# Patient Record
Sex: FEMALE | Race: BLACK OR AFRICAN AMERICAN | Employment: FULL TIME | URBAN - METROPOLITAN AREA
[De-identification: names, ages, dates, MRNs, and addresses within clinical notes are randomized per-mention and may not be internally consistent; named-entity substitution may affect disease eponyms.]

---

## 2019-04-27 ENCOUNTER — HOSPITAL ENCOUNTER (EMERGENCY)
Age: 44
Discharge: HOME | End: 2019-04-27
Attending: EMERGENCY MEDICINE
Payer: COMMERCIAL

## 2019-04-27 ENCOUNTER — APPOINTMENT (OUTPATIENT)
Dept: GENERAL RADIOLOGY | Age: 44
End: 2019-04-27
Payer: COMMERCIAL

## 2019-04-27 VITALS
BODY MASS INDEX: 32.14 KG/M2 | WEIGHT: 200 LBS | DIASTOLIC BLOOD PRESSURE: 84 MMHG | SYSTOLIC BLOOD PRESSURE: 169 MMHG | HEART RATE: 85 BPM | TEMPERATURE: 98.6 F | HEIGHT: 66 IN | RESPIRATION RATE: 17 BRPM | OXYGEN SATURATION: 100 %

## 2019-04-27 DIAGNOSIS — S39.012A STRAIN OF LUMBAR REGION, INITIAL ENCOUNTER: Primary | ICD-10-CM

## 2019-04-27 PROCEDURE — 99284 EMERGENCY DEPT VISIT MOD MDM: CPT

## 2019-04-27 PROCEDURE — 6370000000 HC RX 637 (ALT 250 FOR IP): Performed by: STUDENT IN AN ORGANIZED HEALTH CARE EDUCATION/TRAINING PROGRAM

## 2019-04-27 PROCEDURE — 96372 THER/PROPH/DIAG INJ SC/IM: CPT

## 2019-04-27 PROCEDURE — 72100 X-RAY EXAM L-S SPINE 2/3 VWS: CPT

## 2019-04-27 PROCEDURE — 6360000002 HC RX W HCPCS: Performed by: STUDENT IN AN ORGANIZED HEALTH CARE EDUCATION/TRAINING PROGRAM

## 2019-04-27 RX ORDER — KETOROLAC TROMETHAMINE 30 MG/ML
30 INJECTION, SOLUTION INTRAMUSCULAR; INTRAVENOUS ONCE
Status: COMPLETED | OUTPATIENT
Start: 2019-04-27 | End: 2019-04-27

## 2019-04-27 RX ORDER — ACETAMINOPHEN 500 MG
1000 TABLET ORAL 3 TIMES DAILY PRN
Qty: 180 TABLET | Refills: 0 | Status: SHIPPED | OUTPATIENT
Start: 2019-04-27 | End: 2020-03-13 | Stop reason: SDUPTHER

## 2019-04-27 RX ORDER — ACETAMINOPHEN 500 MG
1000 TABLET ORAL ONCE
Status: COMPLETED | OUTPATIENT
Start: 2019-04-27 | End: 2019-04-27

## 2019-04-27 RX ORDER — IBUPROFEN 800 MG/1
800 TABLET ORAL EVERY 8 HOURS PRN
Qty: 30 TABLET | Refills: 0 | Status: SHIPPED | OUTPATIENT
Start: 2019-04-27 | End: 2020-03-13 | Stop reason: SDUPTHER

## 2019-04-27 RX ORDER — DIAZEPAM 5 MG/1
5 TABLET ORAL ONCE
Status: COMPLETED | OUTPATIENT
Start: 2019-04-27 | End: 2019-04-27

## 2019-04-27 RX ORDER — CYCLOBENZAPRINE HCL 10 MG
10 TABLET ORAL 3 TIMES DAILY PRN
Qty: 30 TABLET | Refills: 0 | Status: SHIPPED | OUTPATIENT
Start: 2019-04-27 | End: 2019-05-07

## 2019-04-27 RX ADMIN — DIAZEPAM 5 MG: 5 TABLET ORAL at 22:40

## 2019-04-27 RX ADMIN — KETOROLAC TROMETHAMINE 30 MG: 30 INJECTION, SOLUTION INTRAMUSCULAR at 22:40

## 2019-04-27 RX ADMIN — ACETAMINOPHEN 1000 MG: 500 TABLET, FILM COATED ORAL at 22:40

## 2019-04-27 ASSESSMENT — PAIN DESCRIPTION - ORIENTATION: ORIENTATION: RIGHT

## 2019-04-27 ASSESSMENT — ENCOUNTER SYMPTOMS
ABDOMINAL PAIN: 0
SORE THROAT: 0
NAUSEA: 0
VOMITING: 0
SHORTNESS OF BREATH: 0
BACK PAIN: 1
DIARRHEA: 0
COUGH: 0

## 2019-04-27 ASSESSMENT — PAIN DESCRIPTION - LOCATION: LOCATION: BACK

## 2019-04-27 ASSESSMENT — PAIN DESCRIPTION - PAIN TYPE: TYPE: ACUTE PAIN

## 2019-04-27 ASSESSMENT — PAIN SCALES - GENERAL
PAINLEVEL_OUTOF10: 8
PAINLEVEL_OUTOF10: 8

## 2019-04-27 ASSESSMENT — PAIN DESCRIPTION - DESCRIPTORS: DESCRIPTORS: SHOOTING

## 2019-04-28 NOTE — ED TRIAGE NOTES
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 Pt reports a shooting pain in her back and down her legs.

## 2019-04-28 NOTE — ED PROVIDER NOTES
[address]  Emergency Department Encounter  EmergencyMedicine Resident     Pt Maverick Shah  MRN: [id number]  [place] 1975  Date of evaluation: 4/27/19  PCP:  Carter Hernandez MD    CHIEF COMPLAINT       Chief Complaint   Patient presents with    Back Pain       HISTORY OF PRESENT ILLNESS  (Location/Symptom, Timing/Onset, Context/Setting, Quality, Duration, Modifying Factors, Severity.)      Kathrynn Dakins is a 39 y.o. female who presents with acute bilateral lower back pain after she tried to move a heavy patient at work today. Patient was transferring a patient from a stretcher onto a bed; she was pulling a patient towards herself. The patient was very heavy and during the transfer she develops acute onset lower back pain. Since then, her symptoms have become worse, with limited range of motion and inability to bend forward to complete her duties at work. She was sent by her work for evaluation. She denies any numbness or tingling, focal weakness, urinary retention or incontinence, or any saddle anesthesia. No fever and no history of IV drug use. No fall or other traumatic injury. Patient has not taken any medications for her symptoms. She does not have chronic back pain. PAST MEDICAL / SURGICAL / SOCIAL / FAMILY HISTORY      has a past medical history of Asthma, CAD (coronary artery disease), Diabetes mellitus ([address] Hypertension, Pacemaker, and Seizures ([address] has a past surgical history that includes Hysterectomy; Appendectomy; Cholecystectomy; Ankle surgery; hip surgery; Wrist surgery; and Pacemaker insertion (04/07/2014).     Social History     Socioeconomic History    Marital status:      Spouse name: Not on file    Number of children: Not on file    Years of education: Not on file    Highest education level: Not on file   Occupational History    Not on file   Social Needs    Financial resource strain: Not on file    Food insecurity:     Worry: Not on file     Inability: Not on file    Transportation needs:     Medical: Not on file     Non-medical: Not on file   Tobacco Use    Smoking status: Current Some Day Smoker    Smokeless tobacco: Never Used    Tobacco comment: REPORTS CUTTING BACK- DOWN TO 4 A DAY   Substance and Sexual Activity    Alcohol use: No    Drug use: No    Sexual activity: Not on file   Lifestyle    Physical activity:     Days per week: Not on file     Minutes per session: Not on file    Stress: Not on file   Relationships    Social connections:     Talks on phone: Not on file     Gets together: Not on file     Attends Spiritism service: Not on file     Active member of club or organization: Not on file     Attends meetings of clubs or organizations: Not on file     Relationship status: Not on file    Intimate partner violence:     Fear of current or ex partner: Not on file     Emotionally abused: Not on file     Physically abused: Not on file     Forced sexual activity: Not on file   Other Topics Concern    Not on file   Social History Narrative    Not on file       History reviewed. No pertinent family history. Allergies:  Aspirin and Celebrex [celecoxib]    Home Medications:  Prior to Admission medications    Medication Sig Start Date End Date Taking?  Authorizing Provider   ibuprofen (ADVIL;MOTRIN) 800 MG tablet Take 1 tablet by mouth every 8 hours as needed for Pain 4/27/19  Yes Evelyn Calles MD   acetaminophen (TYLENOL) 500 MG tablet Take 2 tablets by mouth 3 times daily as needed for Pain 4/27/19  Yes Evelyn Calles MD   cyclobenzaprine (FLEXERIL) 10 MG tablet Take 1 tablet by mouth 3 times daily as needed for Muscle spasms 4/27/19 5/7/19 Yes Evelyn Calles MD   Alcohol Swabs (B-D SINGLE USE SWABS REGULAR) PADS  3/15/16   Historical Provider, MD   isosorbide mononitrate (IMDUR) 120 MG CR tablet  3/15/16   Historical Provider, MD   topiramate (TOPAMAX) 100 MG tablet  3/15/16   Historical Provider, MD   venlafaxine [facility]) 37.5 MG tablet  3/15/16   Historical Provider, MD   atorvastatin (LIPITOR) 80 MG tablet  2/19/16   Historical Provider, MD   REXULTI 2 MG TABS tablet  2/19/16   Historical Provider, MD   carvedilol (COREG) 12.5 MG tablet  2/17/16   Historical Provider, MD   FLOVENT DISKUS 100 MCG/BLIST AEPB  2/18/16   Historical Provider, MD   hydrOXYzine (ATARAX) 25 MG tablet  2/19/16   Historical Provider, MD [place] SHORT PEN NEEDLES 31G X 8 MM [facility]  2/17/16   Historical Provider, MD   EASY TOUCH LANCETS 30G/TWIST [facility]  2/17/16   Historical Provider, MD   levETIRAcetam (KEPPRA) 1000 MG tablet  2/17/16   Historical Provider, MD   lidocaine (LIDAMANTLE) 3 % CREA  1/18/16   Historical Provider, MD   loratadine (CLARITIN) 10 MG tablet  1/28/16   Historical Provider, MD   losartan-hydrochlorothiazide (HYZAAR) 100-25 MG per tablet  2/17/16   Historical Provider, MD   magnesium oxide (MAG-OX) 400 (241.3 MG) MG TABS tablet  2/17/16   Historical Provider, MD   metFORMIN (GLUCOPHAGE) 500 MG tablet  1/18/16   Historical Provider, MD   nadolol (CORGARD) 40 MG tablet  2/17/16   Historical Provider, MD   ondansetron (ZOFRAN) 4 MG tablet  11/25/15   Historical Provider, MD   oxyCODONE-acetaminophen (PERCOCET) 5-325 MG per tablet  2/25/16   Historical Provider, MD   pantoprazole (PROTONIX) 40 MG tablet  2/17/16   Historical Provider, MD   JANUMET  MG per tablet  2/27/16   Historical Provider, MD   venlafaxine (EFFEXOR-XR) 75 MG XR capsule  2/19/16   Historical Provider, MD   Insulin Aspart (NOVOLOG SC) Inject into the skin    Historical Provider, MD   meloxicam (MOBIC) 15 MG tablet Take 1 tablet by mouth daily 3/3/16   Junbedee Parker, DPM   isosorbide mononitrate (IMDUR) 30 MG CR tablet Take 1 tablet by mouth daily 6/5/15   Cheikh Almanza MD   beclomethasone (QVAR) 80 MCG/ACT inhaler Inhale 2 puffs into the lungs 2 times daily 6/5/15   Cheikh Almanza MD   traZODone (DESYREL) 50 MG tablet Take 1 tablet by mouth nightly as needed for Sleep 6/5/15   Tunde Franks MD   insulin glargine (LANTUS) 100 UNIT/ML injection vial Inject 15 Units into the skin daily 6/5/15   Tunde Franks MD   amLODIPine (NORVASC) 10 MG tablet Take 1 tablet by mouth daily 6/5/15   Tunde Franks MD   clopidogrel (PLAVIX) 75 MG tablet Take 1 tablet by mouth daily 6/5/15   Tunde Franks MD   ranolazine (RANEXA) 1000 MG SR tablet Take 500 mg by mouth 2 times daily. Historical Provider, MD   HYDROCHLOROTHIAZIDE PO Take  by mouth daily. Historical Provider, MD       REVIEW OF SYSTEMS    (2-9 systems for level 4, 10 or more for level 5)      Review of Systems   Constitutional: Negative for chills, fatigue and fever. HENT: Negative for congestion and sore throat. Eyes: Negative for visual disturbance. Respiratory: Negative for cough and shortness of breath. Cardiovascular: Negative for chest pain. Gastrointestinal: Negative for abdominal pain, diarrhea, nausea and vomiting. Genitourinary: Negative for dysuria, flank pain and hematuria. Musculoskeletal: Positive for back pain. Negative for arthralgias, gait problem, neck pain and neck stiffness. Skin: Negative for rash and wound. Neurological: Negative for syncope, weakness, light-headedness, numbness and headaches. PHYSICAL EXAM   (up to 7 for level 4, 8 or more for level 5)      INITIAL VITALS:   BP (!) 169/84   Pulse 85   Temp 98.6 °F (37 °C)   Resp 17   Ht 5' 6\" (1.676 m)   Wt 200 lb (90.7 kg)   SpO2 100%   BMI 32.28 kg/m²     Physical Exam   Gen. Appearance: patient appears well, nondistressed. HEENT: head atraumatic, normocephalic. Pupils equal and reactive to light. Oropharynx clear and moist.  Neck: Supple, normal range of motion. No lymphadenopathy. Pulmonary: Lungs clear to auscultation bilaterally. Equal air entry right left. Cardiovascular:  Heart sounds normal, no murmurs.   Peripheral pulses strong, regular, equal.   Abdomen: Soft, nontender, nondistended. Bowel sounds normal. No palpable masses. Back: Atraumatic in appearance. Mild midline tenderness with palpation of lumbar spine. No tenderness to palpation of cervical or thoracic spine. There is tenderness to palpation of paraspinal muscles bilaterally, worse on the right, with tenderness to palpation of the right upper buttock. Straight leg raise positive on the right. Neurology: GCS 15. Oriented to person place and time. Normal tone and power in all 4 extremities. No sensory deficits. Skin: Warm, dry, well perfused      DIFFERENTIAL  DIAGNOSIS     PLAN (LABS / Catherine Rhyme / EKG):  Orders Placed This Encounter   Procedures    XR LUMBAR SPINE (2-3 VIEWS)       MEDICATIONS ORDERED:  Orders Placed This Encounter   Medications    ketorolac (TORADOL) injection 30 mg    acetaminophen (TYLENOL) tablet 1,000 mg    diazepam (VALIUM) tablet 5 mg    ibuprofen (ADVIL;MOTRIN) 800 MG tablet     Sig: Take 1 tablet by mouth every 8 hours as needed for Pain     Dispense:  30 tablet     Refill:  0    acetaminophen (TYLENOL) 500 MG tablet     Sig: Take 2 tablets by mouth 3 times daily as needed for Pain     Dispense:  180 tablet     Refill:  0    cyclobenzaprine (FLEXERIL) 10 MG tablet     Sig: Take 1 tablet by mouth 3 times daily as needed for Muscle spasms     Dispense:  30 tablet     Refill:  0       DDX:   cauda equina, herniated disc, muscle spasm    DIAGNOSTIC RESULTS / EMERGENCY DEPARTMENT COURSE / MDM     LABS:  No results found for this visit on 04/27/19. IMPRESSION: 39year old patient presents with lower back pain after straining herself at work. Patient is afebrile, hemodynamically stable, and in no acute distress. Appropriately interactive and cooperative with interview and examination.   Will respiratory effort, lungs clear bilaterally, heart sounds normal, abdomen benign, neurologically normal.  Absolutely no findings to suggest cauda equina; there is no numbness or tingling, no focal weakness, no bowel or bladder symptoms, no saddle anesthesia. No elements of history or findings on examination to suggest epidural abscess; she is afebrile, has no history of IV drug use, and has no point tenderness and no increased warmth over the spine. Patient appears to have muscle spasm versus possible herniated disc. Plan for x-ray lumbar spine, Toradol, Tylenol, Valium. Reassess. Likely discharge with outpatient follow-up. RADIOLOGY:  XR LUMBAR SPINE (2-3 VIEWS) (Final result)   Result time 04/27/19 23:11:01   Final result by Stephanie Myers MD (04/27/19 23:11:01)                Impression:    Unremarkable examination of the lumbar spine. Extensive aortoiliac atherosclerotic disease, greater than expected for age. Narrative:    EXAMINATION:  3 XRAY VIEWS OF THE LUMBAR SPINE    4/27/2019 11:05 pm    COMPARISON:  None. HISTORY:  ORDERING SYSTEM PROVIDED HISTORY: work injury; midline pain w/ right sided  sciatica  TECHNOLOGIST PROVIDED HISTORY:  work injury; midline pain w/ right sided sciatica  Ordering Physician Provided Reason for Exam: work injury; midline pain w/  right sided sciatica  Acuity: Acute  Type of Exam: Initial  Mechanism of Injury: Pt states low back pain radiating down right leg started  about 6 hrs ago when transferring a patient at [facility]. Relevant Medical/Surgical History: Pt states low back pain radiating down  right leg started about 6 hrs ago when transferring a patient at [facility]. FINDINGS:  Lumbar vertebral bodies are normal in height and alignment. No evidence of  fracture. Visualized sacrum is unremarkable. No significant degenerative changes.                 EKG  None    All EKG's are interpreted by the Emergency Department Physician who either signs or Co-signs this chart in the absence of a cardiologist.    EMERGENCY DEPARTMENT COURSE:    Patient was counseled to follow up with their Primary Care Provider as soon as

## 2019-04-30 ENCOUNTER — TELEPHONE (OUTPATIENT)
Dept: BARIATRICS/WEIGHT MGMT | Age: 44
End: 2019-04-30

## 2019-04-30 NOTE — TELEPHONE ENCOUNTER
Online Info Session Completed:  on 4/29/19. Per pts online session BMI 34.9 per last visit in [facility] facility on 4/27/19 BMI is 32.28. Per MMO guidelines and [place] guidelines patient does not meet criteria for surgical weight loss. I called patient to inform her of this and she states I am wrong because she qualified for [facility] and [facility]'s Weight loss programs. I told her I am not sure but I was going by MMO and [place] guidelines and she got mad and hung up on me.

## 2019-05-15 ENCOUNTER — HOSPITAL ENCOUNTER (OUTPATIENT)
Dept: PHYSICAL THERAPY | Age: 44
Setting detail: THERAPIES SERIES
Discharge: HOME | End: 2019-05-15
Payer: COMMERCIAL

## 2019-05-15 PROCEDURE — 97110 THERAPEUTIC EXERCISES: CPT

## 2019-05-15 PROCEDURE — 97161 PT EVAL LOW COMPLEX 20 MIN: CPT

## 2019-05-15 ASSESSMENT — PAIN DESCRIPTION - DESCRIPTORS: DESCRIPTORS: CONSTANT;SHARP

## 2019-05-15 ASSESSMENT — PAIN DESCRIPTION - ORIENTATION: ORIENTATION: LOWER;RIGHT

## 2019-05-15 ASSESSMENT — PAIN DESCRIPTION - FREQUENCY: FREQUENCY: CONTINUOUS

## 2019-05-15 ASSESSMENT — PAIN SCALES - GENERAL: PAINLEVEL_OUTOF10: 7

## 2019-05-15 ASSESSMENT — PAIN DESCRIPTION - LOCATION: LOCATION: BACK;LEG

## 2019-05-15 NOTE — PROGRESS NOTES
[facility] Exercise Log  Aquatic, Hip & DLS Program- Phase 1    Date of Eval:  5/15/19                              Primary PT:Juan  Diagnosis:thoracic and lumbar sprain  Things to Focus On (goals): get back to normal  Surgical Precautions:  Medical Precautions:seizure,pacemaker,DM,asthma,HTN  [x] C-9 dates-ends 5/31/19  [x] Occ Med -fax daily note to Dr Ty Richardson  [] Medicare   Try hanging on deep end    Date        Visit #        Walk F/L/R        Marching        Squats        Step-Ups F/L        Heel-toe raises        SLR F/L/R        Knee/Flex/Ext        F/L Lunges        Kickboard Ex. Iso Abd. Push-pull        Paddling                Balance                        Stretches        Achllies        Hamstring                                .                 Pain Rating

## 2019-05-15 NOTE — PROGRESS NOTES
60/Abd 20/ext 10  AROM LLE (degrees)  LLE AROM : WFL  Spine  Lumbar: AROM TRUNK FLEX 30 EXT 10 ROTB 50% SBB 20  Strength RLE  Strength RLE: Exception  R Hip Flexion: 3-/5  R Hip Extension: 3-/5  R Hip ABduction: 3-/5  R Ankle Dorsiflexion: 3+/5  R Ankle Plantar flexion: 3+/5  Strength LLE  Strength LLE: WNL  Bed mobility  Rolling to Left: Independent  Rolling to Right: Independent  Supine to Sit: Independent  Sit to Supine: Independent  Transfers  Sit to Stand: Independent  Stand to sit: Independent  Bed to Chair: Independent  Stand Pivot Transfers: Independent  Ambulation  Ambulation?: Yes  Ambulation 1  Surface: level tile  Device: No Device  Assistance: Independent  Stairs/Curb  Stairs?: No     Exercises  Exercise 1: PREP-prone position 5-10' to eliminate RLE pain  Exercise 2: towel roll on lumbar area when sitting  Exercise 3: proper body mechanics-no flexion or twisting    Assessment   Conditions Requiring Skilled Therapeutic Intervention  Body structures, Functions, Activity limitations: Decreased functional mobility ; Decreased ADL status; Decreased ROM; Decreased strength; Increased Pain  Assessment: lumbar pain radiating to RLE limiting function  Treatment Diagnosis: [id number] difficulty [id number] NEC  Prognosis: Good  Decision Making: Low Complexity  History: injured low back a month ago from lifting at work  Exam: limited trunk ROM weak RLE  Clinical Presentation: oswestry score 48%  Patient Education: extension protocol as in ex sheet  Barriers to Learning: none  REQUIRES PT FOLLOW UP: Yes  Treatment Initiated : therapeutic ex lumbar  Discharge Recommendations: Home independently  Activity Tolerance  Activity Tolerance: Patient limited by pain         Plan   Plan  Times per week: 3x/week-10 visits total  Plan weeks: 3 weeks  Current Treatment Recommendations: Aquatics, ROM, Home Exercise Program  Plan Comment: instructed in extension protocol    OutComes Score  Oswestry CMS Modifier: CK  Oswestry Disability Scores %: 48    Goals  Short term goals  Time Frame for Short term goals: 10 visits  Short term goal 1: decrease lumbar/RLE pain by 50% so patient can sleep better  Short term goal 2: increase Trunk AROM to full   Short term goal 3: increase strength RLE 5/5  Short term goal 4: indep with HEP  Short term goal 5: improve oswestry score from 48 to 58% or better  Patient Goals   Patient goals : get back to normal     Treatment Charges: Minutes Units   []  Ultrasound     []  Electrical-Stim     []  Iontophoresis     []  Traction     []  Massage       [x]  Eval 20 1   []  Gait     [x]  Ther Exercise 20  1    []  Manual Therapy       []  Ther Activities       []  Aquatics     []  Vasopneumatic Device     []  Neuro Re-Ed       []  Other       Total Treatment Time: 40 2        Therapy Time   Individual Concurrent Group Co-treatment   Time In 1120         Time Out 1200         Minutes 40         Timed Code Treatment Minutes: 20 Minutes    Patient Goals:get back to normal    Comments/Assessment:    Rehab Potential:  [x] Good  [] Fair  [] Poor   Suggested Professional Referral:  [x] No  [] Yes:  Barriers to Goal Achievement:  [] No  [x] Yes:possible HNP lumbar  Domestic Concerns:  [x] No  [] Yes:    Treatment Plan:  [] Therapeutic Exercise    [] Modalities:  [] Therapeutic Activity    [] Ultrasound  [] Electrical Stimulation  [] Gait Training     [] Massage       [] Lumbar/Cervical Traction  [] Neuromuscular Re-education [] Cold/hot pack [] Other:  [x] Instruction in HEP              [] Work Conditioning                                  [] Manual Therapy                     [x] Aquatic Therapy     [] Vasocompression  [] Iontophoresis: 4 mg/mL                      Dexamethasone Sodium      Phosphate 40-80mAmin (Allergies Reviewed)     Frequency:         3  X/wk x        3  Wk's 10 visits total      [x] Plans/Goals, Risk/Benefits discussed with pt/family  Comprehension of Education [x] yes  [] Needs Review  Pt/Family Education: [x] Verbal  [x] Demo  [x] Written    More objective information is available upon request.  Thank you for this referral.        Medicare/Regulatory Requirements:  I have reviewed this plan of care and certify a need for   Medically necessary rehabilitation services.   [] Physician Signature    Date:     Electronically signed by: Antoinette Charles, [id number] @ [address], [id number] [place] [address]  Phone [phone]  Fax [phone]

## 2019-05-20 ENCOUNTER — HOSPITAL ENCOUNTER (OUTPATIENT)
Dept: PHYSICAL THERAPY | Age: 44
Setting detail: THERAPIES SERIES
Discharge: HOME | End: 2019-05-20
Payer: COMMERCIAL

## 2019-05-24 ENCOUNTER — APPOINTMENT (OUTPATIENT)
Dept: PHYSICAL THERAPY | Age: 44
End: 2019-05-24
Payer: COMMERCIAL

## 2019-06-11 NOTE — PROGRESS NOTES
[] Other:  [] Vasocompression                 If you have any questions or concerns regarding this patient's care, please contact us.    Thank you for your referral.      Electronically signed by: Filippo England PT

## 2019-08-28 ENCOUNTER — HOSPITAL ENCOUNTER (OUTPATIENT)
Dept: GENERAL RADIOLOGY | Facility: CLINIC | Age: 44
Discharge: HOME | End: 2019-08-30

## 2019-08-28 ENCOUNTER — HOSPITAL ENCOUNTER (OUTPATIENT)
Facility: CLINIC | Age: 44
Discharge: HOME | End: 2019-08-30

## 2019-08-28 DIAGNOSIS — S69.92XA FINGER INJURY, LEFT, INITIAL ENCOUNTER: ICD-10-CM

## 2019-08-28 PROCEDURE — 73140 X-RAY EXAM OF FINGER(S): CPT

## 2019-09-16 ENCOUNTER — HOSPITAL ENCOUNTER (OUTPATIENT)
Dept: GENERAL RADIOLOGY | Facility: CLINIC | Age: 44
Discharge: HOME | End: 2019-09-18

## 2019-09-16 ENCOUNTER — HOSPITAL ENCOUNTER (OUTPATIENT)
Facility: CLINIC | Age: 44
Discharge: HOME | End: 2019-09-18

## 2019-09-16 DIAGNOSIS — S60.042D CONTUSION OF LEFT RING FINGER WITHOUT DAMAGE TO NAIL, SUBSEQUENT ENCOUNTER: ICD-10-CM

## 2019-09-16 PROCEDURE — 73130 X-RAY EXAM OF HAND: CPT

## 2020-03-12 ENCOUNTER — HOSPITAL ENCOUNTER (EMERGENCY)
Age: 45
Discharge: HOME | End: 2020-03-13
Attending: EMERGENCY MEDICINE
Payer: COMMERCIAL

## 2020-03-12 ENCOUNTER — APPOINTMENT (OUTPATIENT)
Dept: GENERAL RADIOLOGY | Age: 45
End: 2020-03-12
Payer: COMMERCIAL

## 2020-03-12 PROCEDURE — 96374 THER/PROPH/DIAG INJ IV PUSH: CPT

## 2020-03-12 PROCEDURE — 85025 COMPLETE CBC W/AUTO DIFF WBC: CPT

## 2020-03-12 PROCEDURE — 71045 X-RAY EXAM CHEST 1 VIEW: CPT

## 2020-03-12 PROCEDURE — 6370000000 HC RX 637 (ALT 250 FOR IP): Performed by: STUDENT IN AN ORGANIZED HEALTH CARE EDUCATION/TRAINING PROGRAM

## 2020-03-12 PROCEDURE — 83605 ASSAY OF LACTIC ACID: CPT

## 2020-03-12 PROCEDURE — 6360000002 HC RX W HCPCS: Performed by: STUDENT IN AN ORGANIZED HEALTH CARE EDUCATION/TRAINING PROGRAM

## 2020-03-12 PROCEDURE — 0100U HC RESPIRPTHGN MULT REV TRANS & AMP PRB TECH 21 TRGT: CPT

## 2020-03-12 PROCEDURE — 80048 BASIC METABOLIC PNL TOTAL CA: CPT

## 2020-03-12 PROCEDURE — 99285 EMERGENCY DEPT VISIT HI MDM: CPT

## 2020-03-12 RX ORDER — SODIUM CHLORIDE, SODIUM LACTATE, POTASSIUM CHLORIDE, AND CALCIUM CHLORIDE .6; .31; .03; .02 G/100ML; G/100ML; G/100ML; G/100ML
30 INJECTION, SOLUTION INTRAVENOUS ONCE
Status: DISCONTINUED | OUTPATIENT
Start: 2020-03-12 | End: 2020-03-12

## 2020-03-12 RX ORDER — ACETAMINOPHEN 500 MG
1000 TABLET ORAL ONCE
Status: COMPLETED | OUTPATIENT
Start: 2020-03-12 | End: 2020-03-12

## 2020-03-12 RX ORDER — KETOROLAC TROMETHAMINE 15 MG/ML
15 INJECTION, SOLUTION INTRAMUSCULAR; INTRAVENOUS ONCE
Status: COMPLETED | OUTPATIENT
Start: 2020-03-12 | End: 2020-03-12

## 2020-03-12 RX ADMIN — KETOROLAC TROMETHAMINE 15 MG: 15 INJECTION, SOLUTION INTRAMUSCULAR; INTRAVENOUS at 23:55

## 2020-03-12 RX ADMIN — ACETAMINOPHEN 1000 MG: 500 TABLET ORAL at 23:55

## 2020-03-12 ASSESSMENT — PAIN SCALES - GENERAL: PAINLEVEL_OUTOF10: 10

## 2020-03-12 ASSESSMENT — PAIN DESCRIPTION - LOCATION: LOCATION: CHEST

## 2020-03-13 VITALS
WEIGHT: 202 LBS | TEMPERATURE: 98.2 F | RESPIRATION RATE: 18 BRPM | OXYGEN SATURATION: 96 % | DIASTOLIC BLOOD PRESSURE: 69 MMHG | HEIGHT: 65 IN | SYSTOLIC BLOOD PRESSURE: 108 MMHG | HEART RATE: 93 BPM | BODY MASS INDEX: 33.66 KG/M2

## 2020-03-13 LAB
ABSOLUTE EOS #: 0.39 K/UL (ref 0–0.44)
ABSOLUTE IMMATURE GRANULOCYTE: <0.03 K/UL (ref 0–0.3)
ABSOLUTE LYMPH #: 1.58 K/UL (ref 1.1–3.7)
ABSOLUTE MONO #: 0.67 K/UL (ref 0.1–1.2)
ADENOVIRUS PCR: NOT DETECTED
ANION GAP SERPL CALCULATED.3IONS-SCNC: 12 MMOL/L (ref 9–17)
BASOPHILS # BLD: 1 % (ref 0–2)
BASOPHILS ABSOLUTE: 0.05 K/UL (ref 0–0.2)
BORDETELLA PARAPERTUSSIS: NOT DETECTED
BORDETELLA PERTUSSIS PCR: NOT DETECTED
BUN BLDV-MCNC: 19 MG/DL (ref 6–20)
BUN/CREAT BLD: ABNORMAL (ref 9–20)
CALCIUM SERPL-MCNC: 9.3 MG/DL (ref 8.6–10.4)
CHLAMYDIA PNEUMONIAE BY PCR: NOT DETECTED
CHLORIDE BLD-SCNC: 100 MMOL/L (ref 98–107)
CO2: 23 MMOL/L (ref 20–31)
CORONAVIRUS 229E PCR: NOT DETECTED
CORONAVIRUS HKU1 PCR: NOT DETECTED
CORONAVIRUS NL63 PCR: NOT DETECTED
CORONAVIRUS OC43 PCR: NOT DETECTED
CREAT SERPL-MCNC: 1.11 MG/DL (ref 0.5–0.9)
DIFFERENTIAL TYPE: ABNORMAL
EOSINOPHILS RELATIVE PERCENT: 5 % (ref 1–4)
GFR AFRICAN AMERICAN: >60 ML/MIN
GFR NON-AFRICAN AMERICAN: 53 ML/MIN
GFR SERPL CREATININE-BSD FRML MDRD: ABNORMAL ML/MIN/{1.73_M2}
GFR SERPL CREATININE-BSD FRML MDRD: ABNORMAL ML/MIN/{1.73_M2}
GLUCOSE BLD-MCNC: 155 MG/DL (ref 70–99)
HCT VFR BLD CALC: 37.9 % (ref 36.3–47.1)
HEMOGLOBIN: 12.1 G/DL (ref 11.9–15.1)
HUMAN METAPNEUMOVIRUS PCR: NOT DETECTED
IMMATURE GRANULOCYTES: 0 %
INFLUENZA A BY PCR: ABNORMAL
INFLUENZA A H1 (2009) PCR: ABNORMAL
INFLUENZA A H1 PCR: ABNORMAL
INFLUENZA A H3 PCR: ABNORMAL
INFLUENZA B BY PCR: NOT DETECTED
LACTIC ACID, SEPSIS WHOLE BLOOD: 1.5 MMOL/L (ref 0.5–1.9)
LACTIC ACID, SEPSIS: NORMAL MMOL/L (ref 0.5–1.9)
LYMPHOCYTES # BLD: 20 % (ref 24–43)
MCH RBC QN AUTO: 28.1 PG (ref 25.2–33.5)
MCHC RBC AUTO-ENTMCNC: 31.9 G/DL (ref 28.4–34.8)
MCV RBC AUTO: 87.9 FL (ref 82.6–102.9)
MONOCYTES # BLD: 8 % (ref 3–12)
MYCOPLASMA PNEUMONIAE PCR: NOT DETECTED
NRBC AUTOMATED: 0 PER 100 WBC
PARAINFLUENZA 1 PCR: NOT DETECTED
PARAINFLUENZA 2 PCR: NOT DETECTED
PARAINFLUENZA 3 PCR: NOT DETECTED
PARAINFLUENZA 4 PCR: NOT DETECTED
PDW BLD-RTO: 13.3 % (ref 11.8–14.4)
PLATELET # BLD: 287 K/UL (ref 138–453)
PLATELET ESTIMATE: ABNORMAL
PMV BLD AUTO: 9.8 FL (ref 8.1–13.5)
POTASSIUM SERPL-SCNC: 4.1 MMOL/L (ref 3.7–5.3)
RBC # BLD: 4.31 M/UL (ref 3.95–5.11)
RBC # BLD: ABNORMAL 10*6/UL
RESP SYNCYTIAL VIRUS PCR: NOT DETECTED
RHINO/ENTEROVIRUS PCR: NOT DETECTED
SEG NEUTROPHILS: 66 % (ref 36–65)
SEGMENTED NEUTROPHILS ABSOLUTE COUNT: 5.23 K/UL (ref 1.5–8.1)
SODIUM BLD-SCNC: 135 MMOL/L (ref 135–144)
SPECIMEN DESCRIPTION: ABNORMAL
WBC # BLD: 7.9 K/UL (ref 3.5–11.3)
WBC # BLD: ABNORMAL 10*3/UL

## 2020-03-13 PROCEDURE — 83605 ASSAY OF LACTIC ACID: CPT

## 2020-03-13 PROCEDURE — 2580000003 HC RX 258: Performed by: EMERGENCY MEDICINE

## 2020-03-13 RX ORDER — METOCLOPRAMIDE HYDROCHLORIDE 5 MG/ML
10 INJECTION INTRAMUSCULAR; INTRAVENOUS ONCE
Status: DISCONTINUED | OUTPATIENT
Start: 2020-03-13 | End: 2020-03-13 | Stop reason: HOSPADM

## 2020-03-13 RX ORDER — ACETAMINOPHEN 500 MG
1000 TABLET ORAL EVERY 8 HOURS PRN
Qty: 30 TABLET | Refills: 0 | Status: SHIPPED | OUTPATIENT
Start: 2020-03-13

## 2020-03-13 RX ORDER — 0.9 % SODIUM CHLORIDE 0.9 %
1000 INTRAVENOUS SOLUTION INTRAVENOUS ONCE
Status: COMPLETED | OUTPATIENT
Start: 2020-03-13 | End: 2020-03-13

## 2020-03-13 RX ORDER — IBUPROFEN 800 MG/1
800 TABLET ORAL EVERY 8 HOURS PRN
Qty: 30 TABLET | Refills: 0 | Status: SHIPPED | OUTPATIENT
Start: 2020-03-13

## 2020-03-13 RX ORDER — DIPHENHYDRAMINE HYDROCHLORIDE 50 MG/ML
25 INJECTION INTRAMUSCULAR; INTRAVENOUS ONCE
Status: DISCONTINUED | OUTPATIENT
Start: 2020-03-13 | End: 2020-03-13 | Stop reason: HOSPADM

## 2020-03-13 RX ADMIN — SODIUM CHLORIDE 1000 ML: 9 INJECTION, SOLUTION INTRAVENOUS at 02:10

## 2020-03-13 NOTE — ED PROVIDER NOTES
FACULTY SIGN-OUT  ADDENDUM       Patient: Tiffany Khan   MRN: [id number]  PCP:  Familia Carreon MD  The patient's initial evaluation and plan have been discussed with the prior provider who initially evaluated the patient. Nursing Notes, Past Medical Hx, Past Surgical Hx, Social Hx, Allergies, and Family Hx were all reviewed. Pertinent Comments: The patient is a 38 y.o. female taken in [facility] with viral syndrome symptoms but family contact who may possibly be a person under investigation for Covid in other city. Approximately a week ago began having symptoms of URI with cough and muscle with fevers and chills. Patient presented here for evaluation. We are awaiting viral PCR panel. Viral PCR panel came back equivocal/weakly positive for influenza A. Given that this was borderline we did contact the health department and on 3 separate calls between resident as well as myself the attending at the time in signout and health prior to beneficial they decided that she was not a \"person under investigation\". They do wish her to self quarantine and her demographic information including personal phone number have been given to them to follow-up tomorrow to see if they wish to continue this or change their mind and test.    They specifically told us not to test this evening as not a person under investigation. It should be noted I did discuss this with the patient in the room wearing and 95 mask as well as eye protection and gloves with 6 feet distance between myself and the patient.       ED COURSE      The patient was given the following medications:  Orders Placed This Encounter   Medications    DISCONTD: lactated ringers bolus    DISCONTD: piperacillin-tazobactam (ZOSYN) 4.5 g in dextrose 5 % 100 mL IVPB (mini-bag)    DISCONTD: vancomycin (VANCOCIN) 2,250 mg in dextrose 5 % 500 mL IVPB    acetaminophen (TYLENOL) tablet 1,000 mg    ketorolac (TORADOL) injection 15 mg    0.9 % sodium chloride bolus    metoclopramide (REGLAN) injection 10 mg    diphenhydrAMINE (BENADRYL) injection 25 mg    acetaminophen (TYLENOL) 500 MG tablet     Sig: Take 2 tablets by mouth every 8 hours as needed for Pain     Dispense:  30 tablet     Refill:  0    ibuprofen (ADVIL;MOTRIN) 800 MG tablet     Sig: Take 1 tablet by mouth every 8 hours as needed for Pain     Dispense:  30 tablet     Refill:  0       RECENT VITALS:   BP: 108/69  Pulse: 93  Resp: 18  Temp: 98.2 °F (36.8 °C) SpO2: 96 %    (Please note that portions of this note were completed with a voice recognition program.  Efforts were made to edit the dictations but occasionally words are mis-transcribed.)    MD Veronica Ramos  Attending Emergency Medicine Physician        Derrick Varela MD  03/13/20 2992

## 2020-03-13 NOTE — ED PROVIDER NOTES
[facility]  Emergency Department Encounter  Emergency Medicine Resident     Pt Name: Hammad Torres  MRN: [id number]  [place] 1975  Date of evaluation: 3/12/20  PCP:  Maria Dloores Gonzalez MD    [address] [place] [address]       Chief Complaint   Patient presents with    Cough    Fever    Shortness of Breath    Chest Pain       HISTORY OF PRESENT ILLNESS  (Location/Symptom, Timing/Onset, Context/Setting, Quality, Duration, Modifying Factors, Severity.)    Hammad Torres is a 38 y.o. female who presents with cough fever shortness of breath and chest pain. Patient has generalized body aches. She states that she was with her cousin who had a questionable exposure to coronavirus in [facility]. She is concerned that she may have this. She is also having diarrhea nausea and vomiting. PAST MEDICAL / SURGICAL / SOCIAL / FAMILY HISTORY    has a past medical history of Asthma, CAD (coronary artery disease), Diabetes mellitus ([facility] [address] Hypertension, Pacemaker, and Seizures ([facility] [address] has a past surgical history that includes Hysterectomy; Appendectomy; Cholecystectomy; Ankle surgery; hip surgery; Wrist surgery; and Pacemaker insertion (04/07/2014).     Social History     Socioeconomic History    Marital status:      Spouse name: Not on file    Number of children: Not on file    Years of education: Not on file    Highest education level: Not on file   Occupational History    Not on file   Social Needs    Financial resource strain: Not on file    Food insecurity     Worry: Not on file     Inability: Not on file    Transportation needs     Medical: Not on file     Non-medical: Not on file   Tobacco Use    Smoking status: Current Some Day Smoker    Smokeless tobacco: Never Used    Tobacco comment: REPORTS CUTTING BACK- DOWN TO 4 A DAY   Substance and Sexual Activity    Alcohol use: No    Drug use: No    Sexual activity: Not on file   Lifestyle    Physical activity     Days per week: Not on file     Minutes per session: Not on file    Stress: Not on file   Relationships    Social connections     Talks on phone: Not on file     Gets together: Not on file     Attends [employer] service: Not on file     Active member of club or organization: Not on file     Attends meetings of clubs or organizations: Not on file     Relationship status: Not on file    Intimate partner violence     Fear of current or ex partner: Not on file     Emotionally abused: Not on file     Physically abused: Not on file     Forced sexual activity: Not on file   Other Topics Concern    Not on file   Social History Narrative    Not on file       History reviewed. No pertinent family history. Allergies:    Aspirin and Celebrex [celecoxib]    Home Medications:  Prior to Admission medications    Medication Sig Start Date End Date Taking?  Authorizing Provider   acetaminophen (TYLENOL) 500 MG tablet Take 2 tablets by mouth every 8 hours as needed for Pain 3/13/20  Yes Costa Kuna, DO   ibuprofen (ADVIL;MOTRIN) 800 MG tablet Take 1 tablet by mouth every 8 hours as needed for Pain 3/13/20  Yes Costa Brito, DO   Alcohol Swabs (B-D SINGLE USE SWABS REGULAR) PADS  3/15/16   Historical Provider, MD   isosorbide mononitrate (IMDUR) 120 MG CR tablet  3/15/16   Historical Provider, MD   topiramate (TOPAMAX) 100 MG tablet  3/15/16   Historical Provider, MD   venlafaxine (EFFEXOR) 37.5 MG tablet  3/15/16   Historical Provider, MD   atorvastatin (LIPITOR) 80 MG tablet  2/19/16   Historical Provider, MD   REXULTI 2 MG TABS tablet  2/19/16   Historical Provider, MD   carvedilol (COREG) 12.5 MG tablet  2/17/16   Historical Provider, MD   FLOVENT DISKUS 100 MCG/BLIST AEPB  2/18/16   Historical Provider, MD   hydrOXYzine (ATARAX) 25 MG tablet  2/19/16   Historical Provider, MD   [address] X 8 MM [address] [facility]  2/17/16   Historical Provider, MD   EASY TOUCH LANCETS 30G/TWIST [address] [facility]  2/17/16   Historical Provider, MD

## 2020-03-13 NOTE — ED NOTES
Report taken from [facility]. Pt resting on stretcher with eyes closed. IV fluid infusing. Pt on continuous pulse ox.      Sarahi Hameed RN  03/13/20 8312

## 2020-03-13 NOTE — ED PROVIDER NOTES
[facility] [facility]  Emergency Department  Emergency Medicine Resident Sign-out     Care of [facility] was assumed from Dr. Damon Becker and is being seen for Cough; Fever; Shortness of Breath; and Chest Pain  . The patient's initial evaluation and plan have been discussed with the prior provider who initially evaluated the patient.      EMERGENCY DEPARTMENT COURSE / MEDICAL DECISION MAKING:       MEDICATIONS GIVEN:  Orders Placed This Encounter   Medications    DISCONTD: lactated ringers bolus    DISCONTD: piperacillin-tazobactam (ZOSYN) 4.5 g in dextrose 5 % 100 mL IVPB (mini-bag)    DISCONTD: vancomycin (VANCOCIN) 2,250 mg in dextrose 5 % 500 mL IVPB    acetaminophen (TYLENOL) tablet 1,000 mg    ketorolac (TORADOL) injection 15 mg    0.9 % sodium chloride bolus    metoclopramide (REGLAN) injection 10 mg    diphenhydrAMINE (BENADRYL) injection 25 mg    acetaminophen (TYLENOL) 500 MG tablet     Sig: Take 2 tablets by mouth every 8 hours as needed for Pain     Dispense:  30 tablet     Refill:  0    ibuprofen (ADVIL;MOTRIN) 800 MG tablet     Sig: Take 1 tablet by mouth every 8 hours as needed for Pain     Dispense:  30 tablet     Refill:  0       LABS / RADIOLOGY:     Labs Reviewed   RESPIRATORY VIRUS PCR PANEL - Abnormal; Notable for the following components:       Result Value    Influenza A by PCR EQUIVOCAL (*)     All other components within normal limits   CBC WITH AUTO DIFFERENTIAL - Abnormal; Notable for the following components:    Seg Neutrophils 66 (*)     Lymphocytes 20 (*)     Eosinophils % 5 (*)     All other components within normal limits   BASIC METABOLIC PANEL - Abnormal; Notable for the following components:    Glucose 155 (*)     CREATININE 1.11 (*)     GFR Non- 53 (*)     All other components within normal limits   LACTATE, SEPSIS   LACTATE, SEPSIS       Xr Chest Portable    Result Date: 3/13/2020  EXAMINATION: ONE XRAY VIEW OF THE CHEST 3/13/2020 12:05 am DISPOSITION:  [x]  Discharge   []  Transfer -    []  Admission -     []  Against Medical Advice   []  Eloped   FOLLOW-UP: Kyra Cintron MD  [address]  [phone]    Schedule an appointment as soon as possible for a visit        DISCHARGE MEDICATIONS: New Prescriptions    ACETAMINOPHEN (TYLENOL) 500 MG TABLET    Take 2 tablets by mouth every 8 hours as needed for Pain    IBUPROFEN (ADVIL;MOTRIN) 800 MG TABLET    Take 1 tablet by mouth every 8 hours as needed for Pain           Galina Mccord DO  Emergency Medicine Resident  [facility]       Galina Mccord [place]  Resident  03/13/20 [id number]

## 2020-06-13 ENCOUNTER — HOSPITAL ENCOUNTER (EMERGENCY)
Age: 45
Discharge: HOME | End: 2020-06-14
Attending: EMERGENCY MEDICINE
Payer: COMMERCIAL

## 2020-06-13 VITALS
RESPIRATION RATE: 20 BRPM | SYSTOLIC BLOOD PRESSURE: 162 MMHG | BODY MASS INDEX: 34.74 KG/M2 | WEIGHT: 208.5 LBS | DIASTOLIC BLOOD PRESSURE: 90 MMHG | HEIGHT: 65 IN | HEART RATE: 104 BPM | TEMPERATURE: 98.4 F | OXYGEN SATURATION: 100 %

## 2020-06-13 PROCEDURE — 2500000003 HC RX 250 WO HCPCS: Performed by: NURSE PRACTITIONER

## 2020-06-13 PROCEDURE — 10060 I&D ABSCESS SIMPLE/SINGLE: CPT

## 2020-06-13 PROCEDURE — 99283 EMERGENCY DEPT VISIT LOW MDM: CPT

## 2020-06-13 RX ORDER — HYDROCODONE BITARTRATE AND ACETAMINOPHEN 5; 325 MG/1; MG/1
2 TABLET ORAL ONCE
Status: COMPLETED | OUTPATIENT
Start: 2020-06-14 | End: 2020-06-14

## 2020-06-13 RX ORDER — LIDOCAINE HYDROCHLORIDE 10 MG/ML
20 INJECTION, SOLUTION INFILTRATION; PERINEURAL ONCE
Status: COMPLETED | OUTPATIENT
Start: 2020-06-13 | End: 2020-06-13

## 2020-06-13 RX ADMIN — LIDOCAINE HYDROCHLORIDE 20 ML: 10 INJECTION, SOLUTION INFILTRATION; PERINEURAL at 23:41

## 2020-06-13 ASSESSMENT — PAIN DESCRIPTION - ONSET: ONSET: GRADUAL

## 2020-06-13 ASSESSMENT — PAIN DESCRIPTION - DESCRIPTORS: DESCRIPTORS: BURNING

## 2020-06-13 ASSESSMENT — PAIN DESCRIPTION - LOCATION: LOCATION: LEG;GROIN

## 2020-06-13 ASSESSMENT — PAIN DESCRIPTION - PAIN TYPE: TYPE: ACUTE PAIN

## 2020-06-13 ASSESSMENT — PAIN SCALES - GENERAL
PAINLEVEL_OUTOF10: 10
PAINLEVEL_OUTOF10: 10

## 2020-06-13 ASSESSMENT — PAIN DESCRIPTION - PROGRESSION: CLINICAL_PROGRESSION: GRADUALLY WORSENING

## 2020-06-13 ASSESSMENT — PAIN - FUNCTIONAL ASSESSMENT: PAIN_FUNCTIONAL_ASSESSMENT: PREVENTS OR INTERFERES SOME ACTIVE ACTIVITIES AND ADLS

## 2020-06-13 ASSESSMENT — PAIN DESCRIPTION - FREQUENCY: FREQUENCY: CONTINUOUS

## 2020-06-14 PROCEDURE — 6370000000 HC RX 637 (ALT 250 FOR IP): Performed by: NURSE PRACTITIONER

## 2020-06-14 RX ADMIN — HYDROCODONE BITARTRATE AND ACETAMINOPHEN 2 TABLET: 5; 325 TABLET ORAL at 00:17

## 2020-06-14 ASSESSMENT — ENCOUNTER SYMPTOMS
CONSTIPATION: 0
VOMITING: 0
NAUSEA: 0
COUGH: 0
SHORTNESS OF BREATH: 0
SORE THROAT: 0
RHINORRHEA: 0
DIARRHEA: 0
COLOR CHANGE: 0
SINUS PRESSURE: 0
ABDOMINAL PAIN: 0
WHEEZING: 0

## 2020-06-14 ASSESSMENT — PAIN SCALES - GENERAL: PAINLEVEL_OUTOF10: 10

## 2020-06-14 NOTE — ED PROVIDER NOTES
(ADVIL;MOTRIN) 800 MG TABLET    Take 1 tablet by mouth every 8 hours as needed for Pain    INSULIN ASPART (NOVOLOG SC)    Inject into the skin    INSULIN GLARGINE (LANTUS) 100 UNIT/ML INJECTION VIAL    Inject 15 Units into the skin daily    ISOSORBIDE MONONITRATE (IMDUR) 120 MG CR TABLET        ISOSORBIDE MONONITRATE (IMDUR) 30 MG CR TABLET    Take 1 tablet by mouth daily    JANUMET  MG PER TABLET        LEVETIRACETAM (KEPPRA) 1000 MG TABLET        LIDOCAINE (LIDAMANTLE) 3 % CREA        LORATADINE (CLARITIN) 10 MG TABLET        LOSARTAN-HYDROCHLOROTHIAZIDE (HYZAAR) 100-25 MG PER TABLET        MAGNESIUM OXIDE (MAG-OX) 400 (241.3 MG) MG TABS TABLET        MELOXICAM (MOBIC) 15 MG TABLET    Take 1 tablet by mouth daily    METFORMIN (GLUCOPHAGE) 500 MG TABLET        NADOLOL (CORGARD) 40 MG TABLET        ONDANSETRON (ZOFRAN) 4 MG TABLET        OXYCODONE-ACETAMINOPHEN (PERCOCET) 5-325 MG PER TABLET        PANTOPRAZOLE (PROTONIX) 40 MG TABLET        RANOLAZINE (RANEXA) 1000 MG SR TABLET    Take 500 mg by mouth 2 times daily. REXULTI 2 MG TABS TABLET        TOPIRAMATE (TOPAMAX) 100 MG TABLET        TRAZODONE (DESYREL) 50 MG TABLET    Take 1 tablet by mouth nightly as needed for Sleep    ULTICARE SHORT PEN NEEDLES 31G X 8 MM MISC        VENLAFAXINE (EFFEXOR) 37.5 MG TABLET        VENLAFAXINE (EFFEXOR-XR) 75 MG XR CAPSULE           PAST MEDICAL HISTORY         Diagnosis Date    Asthma     CAD (coronary artery disease)     Diabetes mellitus ([facility] [address]     Hypertension     Pacemaker     Seizures ([facility] [address]        SURGICAL HISTORY           Procedure Laterality Date    ANKLE SURGERY      APPENDECTOMY      CHOLECYSTECTOMY      HIP SURGERY      HYSTERECTOMY      PACEMAKER INSERTION  04/07/2014    WRIST SURGERY           FAMILY HISTORY     No family history on file. No family status information on file. SOCIAL HISTORY      reports that she has been smoking.  She has never used smokeless tobacco. She reports that General: Skin is warm and dry. Findings: No rash. Neurological:      Mental Status: She is alert and oriented to person, place, and time. LABS:  Labs Reviewed - No data to display    All other labs were within normal range or not returned as of this dictation. EMERGENCY DEPARTMENT COURSE and DIFFERENTIAL DIAGNOSIS/MDM:   Vitals:    Vitals:    06/13/20 2309 06/13/20 2312   BP: (!) 162/90    Pulse: 104    Resp: 20    Temp: 98.4 °F (36.9 °C)    TempSrc: Oral    SpO2: 100%    Weight:  208 lb 8 oz (94.6 kg)   Height:  5' 5\" (1.651 m)       Medical Decision Making: packing removed in 24 hours. Warm compresses  Continue doxycline. We will add bactrban for the nares she was offered a script for pain medications but she declines. she was given two norco in ER. PROCEDURES:  Wound was with Betadine and draped in sterile fashion. It was anesthetized with 1% lidocaine without epinephrine. An incision and drainage was made with an 11 blade scalpel through the skin. A moderate amount of purulent drainage was obtained. The wound was then probed with forceps and no other loculations were identified. He was then packed with quarter inch iodoform Nu Gauze. The patient tolerated well. FINAL IMPRESSION      1. Abscess          DISPOSITION/PLAN   DISPOSITION Decision To Discharge 06/13/2020 11:53:21 PM      PATIENT REFERRED TO:   Vianey Sagastume MD  [address] [facility] [facility]  [phone]    Schedule an appointment as soon as possible for a visit       [facility] ED  [address] [facility]  [phone]    If symptoms worsen      DISCHARGE MEDICATIONS:     New Prescriptions    MUPIROCIN (BACTROBAN NASAL) 2 % NASAL OINTMENT    Take by Nasal route 2 times daily.            (Please note that portions of this note were completed with a voice recognition program.  Efforts were made to edit the dictations but occasionally words are mis-transcribed.)    Baltazar Juarez

## 2021-11-02 ENCOUNTER — HOSPITAL ENCOUNTER (EMERGENCY)
Age: 46
Discharge: HOME | End: 2021-11-02
Attending: EMERGENCY MEDICINE
Payer: COMMERCIAL

## 2021-11-02 VITALS
DIASTOLIC BLOOD PRESSURE: 89 MMHG | BODY MASS INDEX: 31.28 KG/M2 | SYSTOLIC BLOOD PRESSURE: 138 MMHG | RESPIRATION RATE: 18 BRPM | WEIGHT: 188 LBS | TEMPERATURE: 98 F | HEART RATE: 88 BPM | OXYGEN SATURATION: 96 %

## 2021-11-02 DIAGNOSIS — J02.9 ACUTE PHARYNGITIS, UNSPECIFIED ETIOLOGY: Primary | ICD-10-CM

## 2021-11-02 LAB
ABSOLUTE EOS #: 0.34 K/UL (ref 0–0.44)
ABSOLUTE IMMATURE GRANULOCYTE: <0.03 K/UL (ref 0–0.3)
ABSOLUTE LYMPH #: 2.22 K/UL (ref 1.1–3.7)
ABSOLUTE MONO #: 0.69 K/UL (ref 0.1–1.2)
ADENOVIRUS PCR: NOT DETECTED
ANION GAP SERPL CALCULATED.3IONS-SCNC: 13 MMOL/L (ref 9–17)
BASOPHILS # BLD: 1 % (ref 0–2)
BASOPHILS ABSOLUTE: 0.05 K/UL (ref 0–0.2)
BORDETELLA PARAPERTUSSIS: NOT DETECTED
BORDETELLA PERTUSSIS PCR: NOT DETECTED
BUN BLDV-MCNC: 17 MG/DL (ref 6–20)
BUN/CREAT BLD: ABNORMAL (ref 9–20)
CALCIUM SERPL-MCNC: 10 MG/DL (ref 8.6–10.4)
CHLAMYDIA PNEUMONIAE BY PCR: NOT DETECTED
CHLORIDE BLD-SCNC: 99 MMOL/L (ref 98–107)
CO2: 27 MMOL/L (ref 20–31)
CORONAVIRUS 229E PCR: NOT DETECTED
CORONAVIRUS HKU1 PCR: NOT DETECTED
CORONAVIRUS NL63 PCR: NOT DETECTED
CORONAVIRUS OC43 PCR: NOT DETECTED
CREAT SERPL-MCNC: 0.85 MG/DL (ref 0.5–0.9)
DIFFERENTIAL TYPE: ABNORMAL
DIRECT EXAM: NORMAL
EOSINOPHILS RELATIVE PERCENT: 5 % (ref 1–4)
GFR AFRICAN AMERICAN: >60 ML/MIN
GFR NON-AFRICAN AMERICAN: >60 ML/MIN
GFR SERPL CREATININE-BSD FRML MDRD: ABNORMAL ML/MIN/{1.73_M2}
GFR SERPL CREATININE-BSD FRML MDRD: ABNORMAL ML/MIN/{1.73_M2}
GLUCOSE BLD-MCNC: 136 MG/DL (ref 70–99)
HCT VFR BLD CALC: 40.9 % (ref 36.3–47.1)
HEMOGLOBIN: 13 G/DL (ref 11.9–15.1)
HUMAN METAPNEUMOVIRUS PCR: NOT DETECTED
IMMATURE GRANULOCYTES: 0 %
INFLUENZA A BY PCR: NOT DETECTED
INFLUENZA A H1 (2009) PCR: NORMAL
INFLUENZA A H1 PCR: NORMAL
INFLUENZA A H3 PCR: NORMAL
INFLUENZA B BY PCR: NOT DETECTED
LYMPHOCYTES # BLD: 32 % (ref 24–43)
Lab: NORMAL
MCH RBC QN AUTO: 29.5 PG (ref 25.2–33.5)
MCHC RBC AUTO-ENTMCNC: 31.8 G/DL (ref 28.4–34.8)
MCV RBC AUTO: 93 FL (ref 82.6–102.9)
MONOCYTES # BLD: 10 % (ref 3–12)
MYCOPLASMA PNEUMONIAE PCR: NOT DETECTED
MYOGLOBIN: 22 NG/ML (ref 25–58)
NRBC AUTOMATED: 0 PER 100 WBC
PARAINFLUENZA 1 PCR: NOT DETECTED
PARAINFLUENZA 2 PCR: NOT DETECTED
PARAINFLUENZA 3 PCR: NOT DETECTED
PARAINFLUENZA 4 PCR: NOT DETECTED
PDW BLD-RTO: 13.2 % (ref 11.8–14.4)
PLATELET # BLD: 294 K/UL (ref 138–453)
PLATELET ESTIMATE: ABNORMAL
PMV BLD AUTO: 9.5 FL (ref 8.1–13.5)
POTASSIUM SERPL-SCNC: 3.8 MMOL/L (ref 3.7–5.3)
RBC # BLD: 4.4 M/UL (ref 3.95–5.11)
RBC # BLD: ABNORMAL 10*6/UL
RESP SYNCYTIAL VIRUS PCR: NOT DETECTED
RHINO/ENTEROVIRUS PCR: NOT DETECTED
SARS-COV-2, PCR: NOT DETECTED
SEG NEUTROPHILS: 52 % (ref 36–65)
SEGMENTED NEUTROPHILS ABSOLUTE COUNT: 3.59 K/UL (ref 1.5–8.1)
SODIUM BLD-SCNC: 139 MMOL/L (ref 135–144)
SPECIMEN DESCRIPTION: NORMAL
SPECIMEN DESCRIPTION: NORMAL
TOTAL CK: 127 U/L (ref 26–192)
TROPONIN INTERP: NORMAL
TROPONIN T: NORMAL NG/ML
TROPONIN, HIGH SENSITIVITY: 10 NG/L (ref 0–14)
WBC # BLD: 6.9 K/UL (ref 3.5–11.3)
WBC # BLD: ABNORMAL 10*3/UL

## 2021-11-02 PROCEDURE — 2580000003 HC RX 258: Performed by: STUDENT IN AN ORGANIZED HEALTH CARE EDUCATION/TRAINING PROGRAM

## 2021-11-02 PROCEDURE — 85025 COMPLETE CBC W/AUTO DIFF WBC: CPT

## 2021-11-02 PROCEDURE — 80048 BASIC METABOLIC PNL TOTAL CA: CPT

## 2021-11-02 PROCEDURE — 84484 ASSAY OF TROPONIN QUANT: CPT

## 2021-11-02 PROCEDURE — 87880 STREP A ASSAY W/OPTIC: CPT

## 2021-11-02 PROCEDURE — 36415 COLL VENOUS BLD VENIPUNCTURE: CPT

## 2021-11-02 PROCEDURE — 6370000000 HC RX 637 (ALT 250 FOR IP): Performed by: STUDENT IN AN ORGANIZED HEALTH CARE EDUCATION/TRAINING PROGRAM

## 2021-11-02 PROCEDURE — 83874 ASSAY OF MYOGLOBIN: CPT

## 2021-11-02 PROCEDURE — 87040 BLOOD CULTURE FOR BACTERIA: CPT

## 2021-11-02 PROCEDURE — 93005 ELECTROCARDIOGRAM TRACING: CPT | Performed by: STUDENT IN AN ORGANIZED HEALTH CARE EDUCATION/TRAINING PROGRAM

## 2021-11-02 PROCEDURE — 99283 EMERGENCY DEPT VISIT LOW MDM: CPT

## 2021-11-02 PROCEDURE — 82550 ASSAY OF CK (CPK): CPT

## 2021-11-02 PROCEDURE — 0202U NFCT DS 22 TRGT SARS-COV-2: CPT

## 2021-11-02 RX ORDER — 0.9 % SODIUM CHLORIDE 0.9 %
1000 INTRAVENOUS SOLUTION INTRAVENOUS ONCE
Status: COMPLETED | OUTPATIENT
Start: 2021-11-02 | End: 2021-11-02

## 2021-11-02 RX ORDER — ONDANSETRON HYDROCHLORIDE 4 MG/5ML
4 SOLUTION ORAL ONCE
Status: COMPLETED | OUTPATIENT
Start: 2021-11-02 | End: 2021-11-02

## 2021-11-02 RX ADMIN — ONDANSETRON 4 MG: 4 SOLUTION ORAL at 16:42

## 2021-11-02 RX ADMIN — SODIUM CHLORIDE 1000 ML: 9 INJECTION, SOLUTION INTRAVENOUS at 16:43

## 2021-11-02 ASSESSMENT — ENCOUNTER SYMPTOMS
CHEST TIGHTNESS: 0
SHORTNESS OF BREATH: 0
APNEA: 0
NAUSEA: 0
CONSTIPATION: 0
SORE THROAT: 1
ABDOMINAL DISTENTION: 0
DIARRHEA: 0
VOMITING: 0
BACK PAIN: 0
TROUBLE SWALLOWING: 1
VOICE CHANGE: 0
ABDOMINAL PAIN: 0

## 2021-11-02 ASSESSMENT — PAIN SCALES - GENERAL: PAINLEVEL_OUTOF10: 7

## 2021-11-02 ASSESSMENT — PAIN DESCRIPTION - PAIN TYPE: TYPE: ACUTE PAIN

## 2021-11-02 NOTE — ED NOTES
Bed: 38  Expected date:   Expected time:   Means of arrival:   Comments:     Isaac Zapien RN  11/02/21 6980

## 2021-11-02 NOTE — ED NOTES
[facility] ED  Emergency Department Encounter  EmergencyMedicine Resident     Pt [facility]  MRN: [id number]  [place] 1975  Date of evaluation: 11/2/21  PCP:  Kin Arce MD    [address] [place] [address]       Chief Complaint   Patient presents with    Pharyngitis     c/o sore throat xs 3 days; OTC meds without relief        HISTORY OF PRESENT ILLNESS  (Location/Symptom, Timing/Onset, Context/Setting, Quality, Duration, Modifying Factors, Severity.)      Paola Serrano is a 51 y.o. female who presents to  emergency department today with complaint of sore throat and generalized weakness for 3 days. Patient was discharged from [facility] Thursday after a 3 day admission with placement  Of one stent and ballooning. On eliqius currently , hx of diabetes and asthma. Patient states she was feeling fine on Thursday but woke up the next morning with fatigue, weakness , muscle aches and a sore throat. Took tylenol without any relief. States her  has similar, more severe symptoms and also came to the ER to get treated. Patient denies chest pain, SOB, abdominal pain, GI or  issues. Denies fever, states she has had consistent chills and a decreased appetite. PAST MEDICAL / SURGICAL / SOCIAL / FAMILY HISTORY      has a past medical history of Asthma, CAD (coronary artery disease), Diabetes mellitus ([address] Hypertension, Pacemaker, and Seizures ([facility] [address] has a past surgical history that includes Hysterectomy; Appendectomy; Cholecystectomy; Ankle surgery; hip surgery; Wrist surgery; and Pacemaker insertion (04/07/2014).     Social History     Socioeconomic History    Marital status:      Spouse name: Not on file    Number of children: Not on file    Years of education: Not on file    Highest education level: Not on file   Occupational History    Not on file   Tobacco Use    Smoking status: Current Some Day Smoker    Smokeless tobacco: Never Used    Tobacco comment: REPORTS CUTTING BACK- DOWN TO 4 A DAY   Substance and Sexual Activity    Alcohol use: No    Drug use: No    Sexual activity: Not on file   Other Topics Concern    Not on file   Social History Narrative    Not on file     Social Determinants of Health     Financial Resource Strain:     Difficulty of Paying Living Expenses:    Food Insecurity:     Worried About Running Out of Food in the Last Year:     Ran Out of Food in the Last Year:    Transportation Needs:     Lack of Transportation (Medical):  Lack of Transportation (Non-Medical):    Physical Activity:     Days of Exercise per Week:     Minutes of Exercise per Session:    Stress:     Feeling of Stress :    Social Connections:     Frequency of Communication with Friends and Family:     Frequency of Social Gatherings with Friends and Family:     Attends Muslim Services:     Active Member of Clubs or Organizations:     Attends Club or Organization Meetings:     Marital Status:    Intimate Partner Violence:     Fear of Current or Ex-Partner:     Emotionally Abused:     Physically Abused:     Sexually Abused:        History reviewed. No pertinent family history. Allergies:  Aspirin and Celebrex [celecoxib]    Home Medications:  Prior to Admission medications    Medication Sig Start Date End Date Taking? Authorizing Provider   mupirocin (BACTROBAN NASAL) 2 % nasal ointment Take by Nasal route 2 times daily.  6/13/20   Pato Pain, APRN - CNP   acetaminophen (TYLENOL) 500 MG tablet Take 2 tablets by mouth every 8 hours as needed for Pain 3/13/20   Trever Zuhair, DO   ibuprofen (ADVIL;MOTRIN) 800 MG tablet Take 1 tablet by mouth every 8 hours as needed for Pain 3/13/20   Trever Zuhair, DO   Alcohol Swabs (B-D SINGLE USE SWABS REGULAR) PADS  3/15/16   Historical Provider, MD   isosorbide mononitrate (IMDUR) 120 MG CR tablet  3/15/16   Historical Provider, MD   topiramate (TOPAMAX) 100 MG tablet  3/15/16   Historical Provider, MD   venlafaxine [facility]) 37.5 MG tablet  3/15/16   Historical Provider, MD   atorvastatin (LIPITOR) 80 MG tablet  2/19/16   Historical Provider, MD   REXULTI 2 MG TABS tablet  2/19/16   Historical Provider, MD   carvedilol (COREG) 12.5 MG tablet  2/17/16   Historical Provider, MD   FLOVENT DISKUS 100 MCG/BLIST AEPB  2/18/16   Historical Provider, MD   hydrOXYzine (ATARAX) 25 MG tablet  2/19/16   Historical Provider, MD Michaela Francois SHORT PEN NEEDLES 31G X 8 MM [address]  2/17/16   Historical Provider, MD   EASY TOUCH LANCETS 30G/TWIST [address]  2/17/16   Historical Provider, MD   levETIRAcetam (KEPPRA) 1000 MG tablet  2/17/16   Historical Provider, MD   lidocaine (LIDAMANTLE) 3 % CREA  1/18/16   Historical Provider, MD   loratadine (CLARITIN) 10 MG tablet  1/28/16   Historical Provider, MD   losartan-hydrochlorothiazide (HYZAAR) 100-25 MG per tablet  2/17/16   Historical Provider, MD   magnesium oxide (MAG-OX) 400 (241.3 MG) MG TABS tablet  2/17/16   Historical Provider, MD   metFORMIN (GLUCOPHAGE) 500 MG tablet  1/18/16   Historical Provider, MD   nadolol (CORGARD) 40 MG tablet  2/17/16   Historical Provider, MD   ondansetron (ZOFRAN) 4 MG tablet  11/25/15   Historical Provider, MD   oxyCODONE-acetaminophen (PERCOCET) 5-325 MG per tablet  2/25/16   Historical Provider, MD   pantoprazole (PROTONIX) 40 MG tablet  2/17/16   Historical Provider, MD   JANUMET  MG per tablet  2/27/16   Historical Provider, MD   venlafaxine (EFFEXOR-XR) 75 MG XR capsule  2/19/16   Historical Provider, MD   Insulin Aspart (NOVOLOG SC) Inject into the skin    Historical Provider, MD   meloxicam (MOBIC) 15 MG tablet Take 1 tablet by mouth daily 3/3/16   Genevieve Sanchez DPM   isosorbide mononitrate (IMDUR) 30 MG CR tablet Take 1 tablet by mouth daily 6/5/15   Nicola Steele MD   beclomethasone (QVAR) 80 MCG/ACT inhaler Inhale 2 puffs into the lungs 2 times daily 6/5/15   Nicola Steele MD   traZODone (DESYREL) 50 MG tablet Take 1 tablet by mouth nightly as needed for Sleep 6/5/15   Emilie Balderas MD   insulin glargine (LANTUS) 100 UNIT/ML injection vial Inject 15 Units into the skin daily 6/5/15   Emilie Balderas MD   amLODIPine (NORVASC) 10 MG tablet Take 1 tablet by mouth daily 6/5/15   Emilie Balderas MD   clopidogrel (PLAVIX) 75 MG tablet Take 1 tablet by mouth daily 6/5/15   Emilie Balderas MD   ranolazine (RANEXA) 1000 MG SR tablet Take 500 mg by mouth 2 times daily. Historical Provider, MD   HYDROCHLOROTHIAZIDE PO Take  by mouth daily. Historical Provider, MD       REVIEW OF SYSTEMS    (2-9 systems for level 4, 10 or more for level 5)      Review of Systems   Constitutional: Positive for chills and fatigue. Negative for activity change, appetite change and fever. HENT: Positive for sore throat and trouble swallowing. Negative for congestion and voice change. Respiratory: Negative for apnea, chest tightness and shortness of breath. Cardiovascular: Negative for chest pain, palpitations and leg swelling. Gastrointestinal: Negative for abdominal distention, abdominal pain, constipation, diarrhea, nausea and vomiting. Genitourinary: Negative for difficulty urinating, frequency and hematuria. Musculoskeletal: Negative for arthralgias, back pain and neck pain. Neurological: Negative for dizziness, tremors, facial asymmetry, light-headedness and headaches. Psychiatric/Behavioral: Negative for agitation, behavioral problems and confusion. The patient is not nervous/anxious. PHYSICAL EXAM   (up to 7 for level 4, 8 or more for level 5)      Vitals:    11/02/21 1518   BP: 138/89   Pulse: 88   Resp: 18   Temp: 98 °F (36.7 °C)   SpO2: 96%   Weight: 188 lb (85.3 kg)     BP Readings from Last 3 Encounters:   11/02/21 138/89   06/13/20 (!) 162/90   03/13/20 108/69      Physical Exam  Vitals and nursing note reviewed. Constitutional:       Appearance: Normal appearance. She is obese.    HENT:      Mouth/Throat: Respiratory Panel, Molecular, with COVID-19 (Restricted: peds pts or suitable admitted adults)    Specimen: Nasopharyngeal Swab   Result Value Ref Range    Specimen Description . NASOPHARYNGEAL SWAB     Adenovirus PCR Not Detected Not Detected    Coronavirus 229E PCR Not Detected Not Detected    Coronavirus HKU1 PCR Not Detected Not Detected    Coronavirus NL63 PCR Not Detected Not Detected    Coronavirus OC43 PCR Not Detected Not Detected    SARS-CoV-2, PCR Not Detected Not Detected    Human Metapneumovirus PCR Not Detected Not Detected    Rhino/Enterovirus PCR Not Detected Not Detected    Influenza A by PCR Not Detected Not Detected    Influenza A H1 PCR NOT REPORTED Not Detected    Influenza A H1 (2009) PCR NOT REPORTED Not Detected    Influenza A H3 PCR NOT REPORTED Not Detected    Influenza B by PCR Not Detected Not Detected    Parainfluenza 1 PCR Not Detected Not Detected    Parainfluenza 2 PCR Not Detected Not Detected    Parainfluenza 3 PCR Not Detected Not Detected    Parainfluenza 4 PCR Not Detected Not Detected    Resp Syncytial Virus PCR Not Detected Not Detected    Bordetella Parapertussis Not Detected Not Detected    B Pertussis by PCR Not Detected Not Detected    Chlamydia pneumoniae By PCR Not Detected Not Detected    Mycoplasma pneumo by PCR Not Detected Not Detected   CBC WITH AUTO DIFFERENTIAL   Result Value Ref Range    WBC 6.9 3.5 - 11.3 k/uL    RBC 4.40 3.95 - 5.11 m/uL    Hemoglobin 13.0 11.9 - 15.1 g/dL    Hematocrit 40.9 36.3 - 47.1 %    MCV 93.0 82.6 - 102.9 fL    MCH 29.5 25.2 - 33.5 pg    MCHC 31.8 28.4 - 34.8 g/dL    RDW 13.2 11.8 - 14.4 %    Platelets 424 602 - 387 k/uL    MPV 9.5 8.1 - 13.5 fL    NRBC Automated 0.0 0.0 per 100 WBC    Differential Type NOT REPORTED     Seg Neutrophils 52 36 - 65 %    Lymphocytes 32 24 - 43 %    Monocytes 10 3 - 12 %    Eosinophils % 5 (H) 1 - 4 %    Basophils 1 0 - 2 %    Immature Granulocytes 0 0 %    Segs Absolute 3.59 1.50 - 8.10 k/uL    Absolute Lymph # 2.22 1.10 - 3.70 k/uL    Absolute Mono # 0.69 0.10 - 1.20 k/uL    Absolute Eos # 0.34 0.00 - 0.44 k/uL    Basophils Absolute 0.05 0.00 - 0.20 k/uL    Absolute Immature Granulocyte <0.03 0.00 - 0.30 k/uL    WBC Morphology NOT REPORTED     RBC Morphology NOT REPORTED     Platelet Estimate NOT REPORTED    BASIC METABOLIC PANEL   Result Value Ref Range    Glucose 136 (H) 70 - 99 mg/dL    BUN 17 6 - 20 mg/dL    CREATININE 0.85 0.50 - 0.90 mg/dL    Bun/Cre Ratio NOT REPORTED 9 - 20    Calcium 10.0 8.6 - 10.4 mg/dL    Sodium 139 135 - 144 mmol/L    Potassium 3.8 3.7 - 5.3 mmol/L    Chloride 99 98 - 107 mmol/L    CO2 27 20 - 31 mmol/L    Anion Gap 13 9 - 17 mmol/L    GFR Non-African American >60 >60 mL/min    GFR African American >60 >60 mL/min    GFR Comment          GFR Staging NOT REPORTED    Troponin   Result Value Ref Range    Troponin, High Sensitivity 10 0 - 14 ng/L    Troponin T NOT REPORTED <0.03 ng/mL    Troponin Interp NOT REPORTED    CK   Result Value Ref Range    Total  26 - 192 U/L   Myoglobin, Serum   Result Value Ref Range    Myoglobin 22 (L) 25 - 58 ng/mL       IMPRESSION:   Patient Is a 50 yo F who presented with sore throat and generalized fatigue and muscle aches. Afebrile, vital signs stable. Discharged from hospital recently after  Stent placement and ballooning. All workup including EKG, troponin, CBC, BMP, strep, myoglobin, CK unremarkable. Blood cultures in process. Patient given zofran and fluids, decision made to discharge patient home and f/u with PCP. Blood cultures collected and in process.                                                                                                                                                                                                                                                 RADIOLOGY:  None    EKG  None    All EKG's are interpreted by the Emergency Department Physician who either signs or Co-signs this chart in the absence of a cardiologist.    EMERGENCY DEPARTMENT COURSE:    ED Course as of Nov 02 1851 Tue Nov 02, 2021 1842 BP: 138/89 [NT]   1842 Pulse: 88 [NT]   1842 Resp: 18 [NT]   1842 SpO2: 96 % [NT]   1842 Temp: 98 °F (36.7 °C) [NT]   1842 CBC WITH AUTO DIFFERENTIAL(!):    WBC 6.9   RBC 4.40   Hemoglobin Quant 13.0   Hematocrit 40.9   MCV 93.0   MCH 29.5   MCHC 31.8   RDW 13.2   Platelet Count 468   MPV 9.5   NRBC Automated 0.0   Differential Type NOT REPORTED   Seg Neutrophils 52   Lymphocytes 32   Monocytes 10   Eosinophils % 5(!)   Basophils 1   Immature Granulocytes 0   Segs Absolute 3.59   Absolute Lymph # 2.22   Absolute Mono # 0.69   Absolute Eos # 0.34   Basophils Absolute 0.05   Absolute Immature Granulocyte <0.03   WBC Morphology NOT REPORTED   . .. [NT]   9025 Myoglobin, Serum(!):    Myoglobin 22(!) [NT]   1842 CK: Total  [NT]   1583 BASIC METABOLIC PANEL(!):    Glucose 136(!)   BUN 17   Creatinine 0.85   Bun/Cre Ratio NOT REPORTED   Calcium 10.0   Sodium 139   Potassium 3.8   Chloride 99   CO2 27   Anion Gap 13   GFR Non- >60   GFR  >60   GFR Comment        GFR Staging NOT REPORTED [NT]   1842 STREP SCREEN GROUP A THROAT:    Specimen Description . THROAT   Special Requests NOT REPORTED   DIRECT EXAM. Rapid Strep A negative. A negative Rapid Group A Strep Screen result does not rule out the possibility of Group A Streptococci in the specimen. A Group A Strep DNA test is available upon request. [NT]   9372 Culture, Blood 1 [NT]   1842 Respiratory Panel, Molecular, with COVID-19 (Restricted: peds pts or suitable admitted adults):    Specimen Description . NASOPHARYNGEAL SWAB   Adenovirus PCR Not Detected   Coronavirus 229E PCR Not Detected   Coronavirus HKU1 PCR Not Detected   Coronavirus NL63 PCR Not Detected   Coronavirus OC Not Detected   SARS-CoV-2, PCR Not Detected   Human Metapneumovirus PCR Not Detected   Rhino/Enterovirus PCR Not Detected   Influenza A by PCR Not Detected Influenza A H1 PCR NOT REPORTED   Influenza A H1 (2009) PCR NOT REPORTED   Influenza A H3 PCR NOT REPORTED   Influenza B by PCR Not Detecte. .. [NT]      ED Course User Index  [NT] Aida Whalen MD           PROCEDURES:  None    CONSULTS:  None    CRITICAL CARE:  None    FINAL IMPRESSION      1.  Acute pharyngitis, unspecified etiology        DISPOSITION / PLAN     DISPOSITION    Home     PATIENT REFERRED TO:  Giovanna Fox MD  [address]  [phone]    Schedule an appointment as soon as possible for a visit in 3 days      DISCHARGE MEDICATIONS:  New Prescriptions    No medications on file     Aida Whalen MD,  Emergency Medicine Rotating Resident  Family Medicine Residency, PGY-3  [facility]  11/2/2021 6:51 PM       (Please note that portions of thisnote were completed with a voice recognition program.  Efforts were made to edit the dictations but occasionally words are mis-transcribed.)       Aida Whalen MD  Resident  11/02/21 [id number]

## 2021-11-02 NOTE — ED PROVIDER NOTES
[facility]     Emergency Department     Faculty Attestation        I performed a history and physical examination of the patient and discussed management with the resident. I reviewed the residents note and agree with the documented findings and plan of care. Any areas of disagreement are noted on the chart. I was personally present for the key portions of any procedures. I have documented in the chart those procedures where I was not present during the key portions. I have reviewed the emergency nurses triage note. I agree with the chief complaint, past medical history, past surgical history, allergies, medications, social and family history as documented unless otherwise noted below. For Physician Assistant/ Nurse Practitioner cases/documentation I have personally evaluated this patient and have completed at least one if not all key elements of the E/M (history, physical exam, and MDM). Additional findings are as noted. Vital Signs: BP: 138/89  Pulse: 88  Resp: 18  Temp: 98 °F (36.7 °C) SpO2: 96 %  PCP:  Charity Braswell MD    Pertinent Comments:     Patient is a 48-year-old female with history of 3 weeks ago receiving 1 stent secondary to a positive stress test and then 3 weeks later having concerning symptoms and found a secondary lesion that received another stent less than a week ago. The stents were done at [facility] with her electrophysiologist being Dr. Med Julio. Patient states then for the last 4 days after discharge began having sore throat as well as diffuse muscle aches and generalized weakness. Denies any chest pain or shortness of breath however and there is been no nasal congestion or runny nose or cough although her  has had those symptoms. While there is no vomiting there has been some nausea though.     On exam patient has heart regular rate and rhythm with no obvious murmurs rubs or gallops as well as lungs were to station bilaterally. Abdomen is soft/nontender. HEENT examination reveals mild posterior erythema but no exudates and has a normal midline uvula. No nasal rhinorrhea or congestion seen and TMs are clear bilaterally. No pain in the calves and strong distal pulses present and no swelling in the lower extremities either. Assessment/plan: Patient with recent 2 catheterizations and stents. Now with symptoms of possible viral syndrome and probably more likely however given to manipulations of her arterial system will obtain brief amount of blood work as well as brief cardiac work-up. Blood cultures x2 as well as CK and myoglobin given her myalgias. Also will send respiratory pathogen panel as well. Treat symptomatically and reevaluate after      EKG Interpretation    Interpreted by emergency department physician    Rhythm: normal sinus   Rate: normal at 75 bpm  Axis: normal  Conduction: normal  ST Segments: no acute change  T Waves: no acute change  Q Waves: no acute change    Clinical Impression:  nonspecific EKG. Critical Care  None    This patient was evaluated in the Emergency Department for symptoms described in the history of present illness. He/she was evaluated in the context of the global COVID-19 pandemic, which necessitated consideration that the patient might be at risk for infection with the SARS-CoV-2 virus that causes COVID-19. Institutional protocols and algorithms that pertain to the evaluation of patients at risk for COVID-19 are in a state of rapid change based on information released by regulatory bodies including the CDC and federal and state organizations. These policies and algorithms were followed during the patient's care in the ED. (Please note that portions of this note were completed with a voice recognition program. Efforts were made to edit the dictations but occasionally words are mis-transcribed.  Whenever words are used in this note in any gender, they shall be construed as though they were used in the gender appropriate to the circumstances; and whenever words are used in this note in the singular or plural form, they shall be construed as though they were used in the form appropriate to the circumstances.)    MD Ramila Bonner  Attending Emergency Medicine Physician             Keith Otero MD  11/02/21 1000 Livan Pitt MD  11/02/21 Lan Pitt MD  11/02/21 1000 Livan Pitt MD  11/02/21 [id number]

## 2021-11-03 LAB
EKG ATRIAL RATE: 75 BPM
EKG P AXIS: 61 DEGREES
EKG P-R INTERVAL: 170 MS
EKG Q-T INTERVAL: 400 MS
EKG QRS DURATION: 86 MS
EKG QTC CALCULATION (BAZETT): 446 MS
EKG R AXIS: 20 DEGREES
EKG T AXIS: 19 DEGREES
EKG VENTRICULAR RATE: 75 BPM

## 2021-11-03 PROCEDURE — 93010 ELECTROCARDIOGRAM REPORT: CPT | Performed by: INTERNAL MEDICINE

## 2021-11-04 NOTE — ED PROVIDER NOTES
[facility]  ED  Emergency Department Encounter  EmergencyMedicine Resident      Pt Name:Kiffany Shireen Libra  MRN: [id number]  [place] 1975  Date of evaluation: 11/2/21  PCP:  Jan Watkins MD     CHIEF COMPLAINT             Chief Complaint   Patient presents with    Pharyngitis       c/o sore throat xs 3 days; OTC meds without relief          HISTORY OF PRESENT ILLNESS  (Location/Symptom, Timing/Onset, Context/Setting, Quality, Duration, Modifying Factors, Severity.)       Minh Kang is a 51 y.o. female who presents to  emergency department today with complaint of sore throat and generalized weakness for 3 days. Patient was discharged from [facility] Thursday after a 3 day admission with placement  Of one stent and ballooning. On eliqius currently , hx of diabetes and asthma. Patient states she was feeling fine on Thursday but woke up the next morning with fatigue, weakness , muscle aches and a sore throat. Took tylenol without any relief. States her  has similar, more severe symptoms and also came to the ER to get treated. Patient denies chest pain, SOB, abdominal pain, GI or  issues. Denies fever, states she has had consistent chills and a decreased appetite.     PAST MEDICAL / SURGICAL / SOCIAL / FAMILY HISTORY       has a past medical history of Asthma, CAD (coronary artery disease), Diabetes mellitus ([address] Hypertension, Pacemaker, and Seizures ([facility] [address]     has a past surgical history that includes Hysterectomy; Appendectomy; Cholecystectomy; Ankle surgery; hip surgery; Wrist surgery; and Pacemaker insertion (04/07/2014).      Social History               Socioeconomic History    Marital status:        Spouse name: Not on file    Number of children: Not on file    Years of education: Not on file    Highest education level: Not on file   Occupational History    Not on file   Tobacco Use    Smoking status: Current Some Day Smoker    Smokeless tobacco: Never Used  Tobacco comment: REPORTS CUTTING BACK- DOWN TO 4 A DAY   Substance and Sexual Activity    Alcohol use: No    Drug use: No    Sexual activity: Not on file   Other Topics Concern    Not on file   Social History Narrative    Not on file      Social Determinants of Health          Financial Resource Strain:     Difficulty of Paying Living Expenses:    Food Insecurity:     Worried About Running Out of Food in the Last Year:     Ran Out of Food in the Last Year:    Transportation Needs:     Lack of Transportation (Medical):  Lack of Transportation (Non-Medical):    Physical Activity:     Days of Exercise per Week:     Minutes of Exercise per Session:    Stress:     Feeling of Stress :    Social Connections:     Frequency of Communication with Friends and Family:     Frequency of Social Gatherings with Friends and Family:     Attends Oriental orthodox Services:     Active Member of Clubs or Organizations:     Attends Club or Organization Meetings:     Marital Status:    Intimate Partner Violence:     Fear of Current or Ex-Partner:     Emotionally Abused:     Physically Abused:     Sexually Abused:             Family History   History reviewed. No pertinent family history.        Allergies:  Aspirin and Celebrex [celecoxib]     Home Medications:  Home Medications           Prior to Admission medications    Medication Sig Start Date End Date Taking? Authorizing Provider   mupirocin (BACTROBAN NASAL) 2 % nasal ointment Take by Nasal route 2 times daily.  6/13/20     THELMA Jeter CNP   acetaminophen (TYLENOL) 500 MG tablet Take 2 tablets by mouth every 8 hours as needed for Pain 3/13/20     Jasson Ellsworth DO   ibuprofen (ADVIL;MOTRIN) 800 MG tablet Take 1 tablet by mouth every 8 hours as needed for Pain 3/13/20     Jasson Ellsworth DO   Alcohol Swabs (B-D SINGLE USE SWABS REGULAR) PADS   3/15/16     Historical Provider, MD   isosorbide mononitrate (IMDUR) 120 MG CR tablet   3/15/16     Historical Provider, MD   topiramate (TOPAMAX) 100 MG tablet   3/15/16     Historical Provider, MD   venlafaxine (EFFEXOR) 37.5 MG tablet   3/15/16     Historical Provider, MD   atorvastatin (LIPITOR) 80 MG tablet   2/19/16     Historical Provider, MD   REXULTI 2 MG TABS tablet   2/19/16     Historical Provider, MD   carvedilol (COREG) 12.5 MG tablet   2/17/16     Historical Provider, MD   FLOVENT DISKUS 100 MCG/BLIST AEPB   2/18/16     Historical Provider, MD   hydrOXYzine (ATARAX) 25 MG tablet   2/19/16     Historical Provider, MD Chichi Leal SHORT PEN NEEDLES 31G X 8 MM [id number]   2/17/16     Historical Provider, MD   EASY TOUCH LANCETS 30G/TWIST [address]   2/17/16     Historical Provider, MD   levETIRAcetam (KEPPRA) 1000 MG tablet   2/17/16     Historical Provider, MD   lidocaine (LIDAMANTLE) 3 % CREA   1/18/16     Historical Provider, MD   loratadine (CLARITIN) 10 MG tablet   1/28/16     Historical Provider, MD   losartan-hydrochlorothiazide (HYZAAR) 100-25 MG per tablet   2/17/16     Historical Provider, MD   magnesium oxide (MAG-OX) 400 (241.3 MG) MG TABS tablet   2/17/16     Historical Provider, MD   metFORMIN (GLUCOPHAGE) 500 MG tablet   1/18/16     Historical Provider, MD   nadolol (CORGARD) 40 MG tablet   2/17/16     Historical Provider, MD   ondansetron (ZOFRAN) 4 MG tablet   11/25/15     Historical Provider, MD   oxyCODONE-acetaminophen (PERCOCET) 5-325 MG per tablet   2/25/16     Historical Provider, MD   pantoprazole (PROTONIX) 40 MG tablet   2/17/16     Historical Provider, MD   JANUMET  MG per tablet   2/27/16     Historical Provider, MD   venlafaxine (EFFEXOR-XR) 75 MG XR capsule   2/19/16     Historical Provider, MD   Insulin Aspart (NOVOLOG SC) Inject into the skin       Historical Provider, MD   meloxicam (MOBIC) 15 MG tablet Take 1 tablet by mouth daily 3/3/16     Villa Gamboa DPM   isosorbide mononitrate (IMDUR) 30 MG CR tablet Take 1 tablet by mouth daily 6/5/15     Espinoza Castro MD beclomethasone (QVAR) 80 MCG/ACT inhaler Inhale 2 puffs into the lungs 2 times daily 6/5/15     Tracy Nowak MD   traZODone (DESYREL) 50 MG tablet Take 1 tablet by mouth nightly as needed for Sleep 6/5/15     Tracy Nowak MD   insulin glargine (LANTUS) 100 UNIT/ML injection vial Inject 15 Units into the skin daily 6/5/15     Tracy Nowak MD   amLODIPine (NORVASC) 10 MG tablet Take 1 tablet by mouth daily 6/5/15     Tracy Nowak MD   clopidogrel (PLAVIX) 75 MG tablet Take 1 tablet by mouth daily 6/5/15     Tracy Nowak MD   ranolazine (RANEXA) 1000 MG SR tablet Take 500 mg by mouth 2 times daily.       Historical Provider, MD   HYDROCHLOROTHIAZIDE PO Take  by mouth daily.       Historical Provider, MD            REVIEW OF SYSTEMS    (2-9 systems for level 4, 10 or more for level 5)       Review of Systems   Constitutional: Positive for chills and fatigue. Negative for activity change, appetite change and fever. HENT: Positive for sore throat and trouble swallowing. Negative for congestion and voice change. Respiratory: Negative for apnea, chest tightness and shortness of breath. Cardiovascular: Negative for chest pain, palpitations and leg swelling. Gastrointestinal: Negative for abdominal distention, abdominal pain, constipation, diarrhea, nausea and vomiting. Genitourinary: Negative for difficulty urinating, frequency and hematuria. Musculoskeletal: Negative for arthralgias, back pain and neck pain. Neurological: Negative for dizziness, tremors, facial asymmetry, light-headedness and headaches. Psychiatric/Behavioral: Negative for agitation, behavioral problems and confusion.  The patient is not nervous/anxious.                PHYSICAL EXAM   (up to 7 for level 4, 8 or more for level 5)       Vitals   Vitals:     11/02/21 1518   BP: 138/89   Pulse: 88   Resp: 18   Temp: 98 °F (36.7 °C)   SpO2: 96%   Weight: 188 lb (85.3 kg)             BP Readings from Last 3 Encounters:   11/02/21 138/89   06/13/20 (!) 162/90   03/13/20 108/69      Physical Exam  Vitals and nursing note reviewed. Constitutional:       Appearance: Normal appearance. She is obese. HENT:      Mouth/Throat:      Mouth: Mucous membranes are moist. No oral lesions. Pharynx: Posterior oropharyngeal erythema present. No pharyngeal swelling or oropharyngeal exudate. Cardiovascular:      Rate and Rhythm: Normal rate and regular rhythm. Pulses: Normal pulses. Heart sounds: Normal heart sounds. Pulmonary:      Effort: Pulmonary effort is normal.      Breath sounds: Normal breath sounds. Abdominal:      General: Abdomen is flat. Bowel sounds are normal.      Palpations: Abdomen is soft. Neurological:      General: No focal deficit present. Mental Status: She is alert and oriented to person, place, and time. Mental status is at baseline. Psychiatric:         Mood and Affect: Mood normal.         Behavior: Behavior normal.         Thought Content:  Thought content normal.         Judgment: Judgment normal.            DIFFERENTIAL  DIAGNOSIS      PLAN (LABS / IMAGING / EKG):      Orders Placed This Encounter   Procedures    STREP SCREEN GROUP A THROAT    Culture, Blood 1    Culture, Blood 1    Respiratory Panel, Molecular, with COVID-19 (Restricted: peds pts or suitable admitted adults)    CBC WITH AUTO DIFFERENTIAL    BASIC METABOLIC PANEL    Troponin    CK    Myoglobin, Serum    EKG 12 Lead         MEDICATIONS ORDERED:  Encounter Medications        Orders Placed This Encounter   Medications    ondansetron (ZOFRAN) 4 MG/5ML solution 4 mg    0.9 % sodium chloride bolus            DDX:   Viral pharyngitis  Sepsis  Strep pharyngitis  NSTEMI      DIAGNOSTIC RESULTS / EMERGENCY DEPARTMENT COURSE / MDM   :         Results for orders placed or performed during the hospital encounter of 11/02/21   STREP SCREEN GROUP A THROAT     Specimen: Throat   Result Value Ref Range     Specimen Description . THROAT       Special Requests NOT REPORTED       Direct Exam           Rapid Strep A negative. A negative Rapid Group A Strep Screen result does not rule out the possibility of Group A Streptococci in the specimen. A Group A Strep DNA test is available upon request.   Respiratory Panel, Molecular, with COVID-19 (Restricted: peds pts or suitable admitted adults)     Specimen: Nasopharyngeal Swab   Result Value Ref Range     Specimen Description . NASOPHARYNGEAL SWAB       Adenovirus PCR Not Detected Not Detected     Coronavirus 229E PCR Not Detected Not Detected     Coronavirus HKU1 PCR Not Detected Not Detected     Coronavirus NL63 PCR Not Detected Not Detected     Coronavirus OC43 PCR Not Detected Not Detected     SARS-CoV-2, PCR Not Detected Not Detected     Human Metapneumovirus PCR Not Detected Not Detected     Rhino/Enterovirus PCR Not Detected Not Detected     Influenza A by PCR Not Detected Not Detected     Influenza A H1 PCR NOT REPORTED Not Detected     Influenza A H1 (2009) PCR NOT REPORTED Not Detected     Influenza A H3 PCR NOT REPORTED Not Detected     Influenza B by PCR Not Detected Not Detected     Parainfluenza 1 PCR Not Detected Not Detected     Parainfluenza 2 PCR Not Detected Not Detected     Parainfluenza 3 PCR Not Detected Not Detected     Parainfluenza 4 PCR Not Detected Not Detected     Resp Syncytial Virus PCR Not Detected Not Detected     Bordetella Parapertussis Not Detected Not Detected     B Pertussis by PCR Not Detected Not Detected     Chlamydia pneumoniae By PCR Not Detected Not Detected     Mycoplasma pneumo by PCR Not Detected Not Detected   CBC WITH AUTO DIFFERENTIAL   Result Value Ref Range     WBC 6.9 3.5 - 11.3 k/uL     RBC 4.40 3.95 - 5.11 m/uL     Hemoglobin 13.0 11.9 - 15.1 g/dL     Hematocrit 40.9 36.3 - 47.1 %     MCV 93.0 82.6 - 102.9 fL     MCH 29.5 25.2 - 33.5 pg     MCHC 31.8 28.4 - 34.8 g/dL     RDW 13.2 11.8 - 14.4 %     Platelets 747 501 - 128 k/uL     MPV 9.5 8.1 - 13.5 fL     NRBC Automated 0.0 0.0 per 100 WBC     Differential Type NOT REPORTED       Seg Neutrophils 52 36 - 65 %     Lymphocytes 32 24 - 43 %     Monocytes 10 3 - 12 %     Eosinophils % 5 (H) 1 - 4 %     Basophils 1 0 - 2 %     Immature Granulocytes 0 0 %     Segs Absolute 3.59 1.50 - 8.10 k/uL     Absolute Lymph # 2.22 1.10 - 3.70 k/uL     Absolute Mono # 0.69 0.10 - 1.20 k/uL     Absolute Eos # 0.34 0.00 - 0.44 k/uL     Basophils Absolute 0.05 0.00 - 0.20 k/uL     Absolute Immature Granulocyte <0.03 0.00 - 0.30 k/uL     WBC Morphology NOT REPORTED       RBC Morphology NOT REPORTED       Platelet Estimate NOT REPORTED     BASIC METABOLIC PANEL   Result Value Ref Range     Glucose 136 (H) 70 - 99 mg/dL     BUN 17 6 - 20 mg/dL     CREATININE 0.85 0.50 - 0.90 mg/dL     Bun/Cre Ratio NOT REPORTED 9 - 20     Calcium 10.0 8.6 - 10.4 mg/dL     Sodium 139 135 - 144 mmol/L     Potassium 3.8 3.7 - 5.3 mmol/L     Chloride 99 98 - 107 mmol/L     CO2 27 20 - 31 mmol/L     Anion Gap 13 9 - 17 mmol/L     GFR Non-African American >60 >60 mL/min     GFR African American >60 >60 mL/min     GFR Comment            GFR Staging NOT REPORTED     Troponin   Result Value Ref Range     Troponin, High Sensitivity 10 0 - 14 ng/L     Troponin T NOT REPORTED <0.03 ng/mL     Troponin Interp NOT REPORTED     CK   Result Value Ref Range     Total  26 - 192 U/L   Myoglobin, Serum   Result Value Ref Range     Myoglobin 22 (L) 25 - 58 ng/mL         IMPRESSION:   Patient Is a 52 yo F who presented with sore throat and generalized fatigue and muscle aches. Afebrile, vital signs stable. Discharged from hospital recently after  Stent placement and ballooning. All workup including EKG, troponin, CBC, BMP, strep, myoglobin, CK unremarkable. Blood cultures in process. Patient given zofran and fluids, decision made to discharge patient home and f/u with PCP. Blood cultures collected and in process.    RADIOLOGY:  None     EKG  None     All EKG's are interpreted by the Emergency Department Physician who either signs or Co-signs this chart in the absence of a cardiologist.     EMERGENCY DEPARTMENT COURSE:         ED Course as of Nov 02 1851 Tue Nov 02, 2021 1842 BP: 138/89 [NT]   1842 Pulse: 88 [NT]   1842 Resp: 18 [NT]   1842 SpO2: 96 % [NT]   1842 Temp: 98 °F (36.7 °C) [NT]   1842 CBC WITH AUTO DIFFERENTIAL(!):    WBC 6.9   RBC 4.40   Hemoglobin Quant 13.0   Hematocrit 40.9   MCV 93.0   MCH 29.5   MCHC 31.8   RDW 13.2   Platelet Count 580   MPV 9.5   NRBC Automated 0.0   Differential Type NOT REPORTED   Seg Neutrophils 52   Lymphocytes 32   Monocytes 10   Eosinophils % 5(!)   Basophils 1   Immature Granulocytes 0   Segs Absolute 3.59   Absolute Lymph # 2.22   Absolute Mono # 0.69   Absolute Eos # 0.34   Basophils Absolute 0.05   Absolute Immature Granulocyte <0.03   WBC Morphology NOT REPORTED   . .. [NT]   1271 Myoglobin, Serum(!):    Myoglobin 22(!) [NT]   1842 CK: Total  [NT]   7876 BASIC METABOLIC PANEL(!):    Glucose 136(!)   BUN 17   Creatinine 0.85   Bun/Cre Ratio NOT REPORTED   Calcium 10.0   Sodium 139   Potassium 3.8   Chloride 99   CO2 27   Anion Gap 13   GFR Non- >60   GFR  >60   GFR Comment        GFR Staging NOT REPORTED [NT]   1842 STREP SCREEN GROUP A THROAT:    Specimen Description . THROAT   Special Requests NOT REPORTED   DIRECT EXAM. Rapid Strep A negative. A negative Rapid Group A Strep Screen result does not rule out the possibility of Group A Streptococci in the specimen.  A Group A Strep DNA test is available upon request. [NT]   3562 Culture, Blood 1 [NT]   1842 Respiratory Panel, Molecular, with COVID-19 (Restricted: peds pts or suitable admitted adults):    Specimen Description . NASOPHARYNGEAL SWAB   Adenovirus PCR Not Detected   Coronavirus 229E PCR Not Detected   Coronavirus HKU1 PCR Not Detected   Coronavirus NL63 PCR Not Detected   Coronavirus OC Not Detected   SARS-CoV-2, PCR Not Detected   Human Metapneumovirus PCR Not Detected   Rhino/Enterovirus PCR Not Detected   Influenza A by PCR Not Detected   Influenza A H1 PCR NOT REPORTED   Influenza A H1 (2009) PCR NOT REPORTED   Influenza A H3 PCR NOT REPORTED   Influenza B by PCR Not Detecte. ..  [NT]       ED Course User Index  [NT] Mahsa Garcia MD               PROCEDURES:  None     CONSULTS:  None     CRITICAL CARE:  None     FINAL IMPRESSION       1. Acute pharyngitis, unspecified etiology        DISPOSITION / PLAN      DISPOSITION    Home      PATIENT REFERRED TO:  Page Seip, MD  [address]  [phone]     Schedule an appointment as soon as possible for a visit in 3 days        DISCHARGE MEDICATIONS:      New Prescriptions     No medications on file      Mahsa Garcia MD,  Emergency Medicine Rotating Resident  Family Medicine Residency, PGY-3  [facility]  11/2/2021 6:51 PM              Mahsa Garcia MD  Resident  11/04/21 [id number]

## 2021-11-08 LAB
CULTURE: NORMAL
CULTURE: NORMAL
Lab: NORMAL
Lab: NORMAL
SPECIMEN DESCRIPTION: NORMAL
SPECIMEN DESCRIPTION: NORMAL

## 2022-02-20 ENCOUNTER — APPOINTMENT (OUTPATIENT)
Dept: GENERAL RADIOLOGY | Age: 47
End: 2022-02-20
Payer: COMMERCIAL

## 2022-02-20 ENCOUNTER — HOSPITAL ENCOUNTER (EMERGENCY)
Age: 47
Discharge: HOME | End: 2022-02-20
Attending: EMERGENCY MEDICINE
Payer: COMMERCIAL

## 2022-02-20 VITALS
RESPIRATION RATE: 18 BRPM | BODY MASS INDEX: 30.7 KG/M2 | WEIGHT: 191 LBS | HEIGHT: 66 IN | HEART RATE: 82 BPM | DIASTOLIC BLOOD PRESSURE: 86 MMHG | TEMPERATURE: 98.8 F | SYSTOLIC BLOOD PRESSURE: 145 MMHG | OXYGEN SATURATION: 99 %

## 2022-02-20 DIAGNOSIS — M25.511 ACUTE PAIN OF RIGHT SHOULDER: Primary | ICD-10-CM

## 2022-02-20 PROCEDURE — 6370000000 HC RX 637 (ALT 250 FOR IP): Performed by: EMERGENCY MEDICINE

## 2022-02-20 PROCEDURE — 99284 EMERGENCY DEPT VISIT MOD MDM: CPT

## 2022-02-20 PROCEDURE — 73030 X-RAY EXAM OF SHOULDER: CPT

## 2022-02-20 RX ORDER — CYCLOBENZAPRINE HCL 10 MG
10 TABLET ORAL ONCE
Status: COMPLETED | OUTPATIENT
Start: 2022-02-20 | End: 2022-02-20

## 2022-02-20 RX ORDER — CYCLOBENZAPRINE HCL 10 MG
10 TABLET ORAL 3 TIMES DAILY PRN
Qty: 21 TABLET | Refills: 0 | Status: SHIPPED | OUTPATIENT
Start: 2022-02-20 | End: 2022-03-02

## 2022-02-20 RX ORDER — HYDROCODONE BITARTRATE AND ACETAMINOPHEN 5; 325 MG/1; MG/1
2 TABLET ORAL ONCE
Status: COMPLETED | OUTPATIENT
Start: 2022-02-20 | End: 2022-02-20

## 2022-02-20 RX ADMIN — HYDROCODONE BITARTRATE AND ACETAMINOPHEN 2 TABLET: 5; 325 TABLET ORAL at 21:01

## 2022-02-20 RX ADMIN — CYCLOBENZAPRINE 10 MG: 10 TABLET, FILM COATED ORAL at 21:01

## 2022-02-20 ASSESSMENT — PAIN DESCRIPTION - ORIENTATION: ORIENTATION: RIGHT

## 2022-02-20 ASSESSMENT — PAIN SCALES - GENERAL
PAINLEVEL_OUTOF10: 10
PAINLEVEL_OUTOF10: 10

## 2022-02-20 ASSESSMENT — PAIN DESCRIPTION - PAIN TYPE: TYPE: ACUTE PAIN

## 2022-02-20 ASSESSMENT — PAIN DESCRIPTION - LOCATION: LOCATION: SHOULDER

## 2022-02-21 ASSESSMENT — ENCOUNTER SYMPTOMS
COLOR CHANGE: 0
SORE THROAT: 0
EYE REDNESS: 0
ABDOMINAL PAIN: 0
EYE DISCHARGE: 0
STRIDOR: 0
WHEEZING: 0
VOMITING: 0
CONSTIPATION: 0
NAUSEA: 0
DIARRHEA: 0
EYE PAIN: 0
SHORTNESS OF BREATH: 0
COUGH: 0

## 2022-02-21 NOTE — ED NOTES
Pt to ER by self, ambulated to room, steady gait. 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 Pt reports she felt a pop, rates pain 10/10, denies analgesics PTA.  Pt appears very uncomfortable, but remains calm, cooperative, respers even non labored, skin warm pink, no obvious deformities, here for eval.      Donavan Mei RN  02/20/22 7253

## 2022-02-21 NOTE — ED PROVIDER NOTES
[facility] ED  EMERGENCY DEPARTMENT ENCOUNTER      Pt Name: Carlos Moreno  MRN: [id number]  [place] 1975  Date of evaluation: 2/20/2022  Provider: Reny Guzmán MD    CHIEF COMPLAINT       Chief Complaint   Patient presents with    Shoulder Injury     right        HISTORY OF PRESENT ILLNESS  (Location/Symptom, Timing/Onset, Context/Setting, Quality, Duration, Modifying Factors, Severity.)   Carlos Moreno is a 51 y.o. female who presents to the emergency department complaining of right shoulder injury at work. 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 Since that time she has been having pain. No tingling or numbness. Just pain on range of motion. Nursing Notes were reviewed. REVIEW OF SYSTEMS    (2-9 systems for level 4, 10 or more for level 5)     Review of Systems   Constitutional: Negative for activity change, appetite change, chills, fatigue and fever. HENT: Negative for congestion, ear pain and sore throat. Eyes: Negative for pain, discharge and redness. Respiratory: Negative for cough, shortness of breath, wheezing and stridor. Cardiovascular: Negative for chest pain. Gastrointestinal: Negative for abdominal pain, constipation, diarrhea, nausea and vomiting. Genitourinary: Negative for decreased urine volume and difficulty urinating. Musculoskeletal: Negative for arthralgias and myalgias. Right shoulder pain and injury, decreased range of motion. Skin: Negative for color change and rash. Neurological: Negative for dizziness, weakness and headaches. Psychiatric/Behavioral: Negative for behavioral problems and confusion. Except as noted above the remainder of the review of systems was reviewed and negative.        PAST MEDICAL HISTORY     Past Medical History:   Diagnosis Date    Asthma     CAD (coronary artery disease)     Diabetes mellitus ([facility]     Hypertension  Pacemaker     Seizures ([facility]        SURGICAL HISTORY       Past Surgical History:   Procedure Laterality Date    ANKLE SURGERY      APPENDECTOMY      CHOLECYSTECTOMY      HIP SURGERY      HYSTERECTOMY      PACEMAKER INSERTION  04/07/2014    WRIST SURGERY         CURRENT MEDICATIONS       Discharge Medication List as of 2/20/2022  9:32 PM      CONTINUE these medications which have NOT CHANGED    Details   mupirocin (BACTROBAN NASAL) 2 % nasal ointment Take by Nasal route 2 times daily. , Disp-1 Tube, R-0, Print      acetaminophen (TYLENOL) 500 MG tablet Take 2 tablets by mouth every 8 hours as needed for Pain, Disp-30 tablet, R-0Print      ibuprofen (ADVIL;MOTRIN) 800 MG tablet Take 1 tablet by mouth every 8 hours as needed for Pain, Disp-30 tablet, R-0Print      Alcohol Swabs (B-D SINGLE USE SWABS REGULAR) PADS Starting 3/15/2016, Until Discontinued, ERIN, Historical Med      !! isosorbide mononitrate (IMDUR) 120 MG CR tablet Historical Med      topiramate (TOPAMAX) 100 MG tablet Historical Med      venlafaxine (EFFEXOR) 37.5 MG tablet Historical Med      atorvastatin (LIPITOR) 80 MG tablet Historical Med      REXULTI 2 MG TABS tablet ERIN      carvedilol (COREG) 12.5 MG tablet Historical Med      FLOVENT DISKUS 100 MCG/BLIST AEPB ERIN      hydrOXYzine (ATARAX) 25 MG tablet Historical Med      ULTICARE SHORT PEN NEEDLES 31G X 8 MM MISC Starting 2/17/2016, Until Discontinued, ERIN, Historical Med      EASY TOUCH LANCETS 30G/TWIST MISC Starting 2/17/2016, Until Discontinued, ERIN, Historical Med      levETIRAcetam (KEPPRA) 1000 MG tablet Historical Med      lidocaine (LIDAMANTLE) 3 % CREA Historical Med      loratadine (CLARITIN) 10 MG tablet Historical Med      losartan-hydrochlorothiazide (HYZAAR) 100-25 MG per tablet Historical Med      magnesium oxide (MAG-OX) 400 (241.3 MG) MG TABS tablet Historical Med      metFORMIN (GLUCOPHAGE) 500 MG tablet Historical Med      nadolol (CORGARD) 40 MG tablet Historical Med      ondansetron (ZOFRAN) 4 MG tablet Historical Med      oxyCODONE-acetaminophen (PERCOCET) 5-325 MG per tablet Historical Med      pantoprazole (PROTONIX) 40 MG tablet Historical Med      JANUMET  MG per tablet ERIN      venlafaxine (EFFEXOR-XR) 75 MG XR capsule Historical Med      Insulin Aspart (NOVOLOG SC) Inject into the skin      meloxicam (MOBIC) 15 MG tablet Take 1 tablet by mouth daily, Disp-30 tablet, R-2      !! isosorbide mononitrate (IMDUR) 30 MG CR tablet Take 1 tablet by mouth daily, Disp-30 tablet, R-0      beclomethasone (QVAR) 80 MCG/ACT inhaler Inhale 2 puffs into the lungs 2 times daily, Disp-1 Inhaler, R-0      traZODone (DESYREL) 50 MG tablet Take 1 tablet by mouth nightly as needed for Sleep, Disp-30 tablet, R-0      insulin glargine (LANTUS) 100 UNIT/ML injection vial Inject 15 Units into the skin daily, Disp-1 vial, R-0      amLODIPine (NORVASC) 10 MG tablet Take 1 tablet by mouth daily, Disp-30 tablet, R-0      clopidogrel (PLAVIX) 75 MG tablet Take 1 tablet by mouth daily, Disp-30 tablet, R-0      ranolazine (RANEXA) 1000 MG SR tablet Take 500 mg by mouth 2 times daily. HYDROCHLOROTHIAZIDE PO Take  by mouth daily. !! - Potential duplicate medications found. Please discuss with provider. ALLERGIES     Aspirin and Celebrex [celecoxib]    FAMILY HISTORY     History reviewed. No pertinent family history. No family status information on file. SOCIAL HISTORY      reports that she has been smoking e-cigarettes. She uses smokeless tobacco. She reports that she does not drink alcohol and does not use drugs.     PHYSICAL EXAM    (up to 7 for level 4, 8 or more for level 5)     ED Triage Vitals   BP Temp Temp Source Pulse Resp SpO2 Height Weight   02/20/22 2041 02/20/22 2044 02/20/22 2041 02/20/22 2041 02/20/22 2041 02/20/22 2041 02/20/22 2041 02/20/22 2041   (!) 145/86 98.8 °F (37.1 °C) Oral 82 18 99 % 5' 5.5\" (1.664 m) 191 lb (86.6 kg)     Physical Exam  Vitals and nursing note reviewed. Constitutional:       General: She is not in acute distress. Appearance: She is well-developed. She is not ill-appearing, toxic-appearing or diaphoretic. HENT:      Head: Normocephalic and atraumatic. Right Ear: External ear normal.      Left Ear: External ear normal.      Nose: Nose normal.      Mouth/Throat:      Mouth: Mucous membranes are moist.   Eyes:      General:         Right eye: No discharge. Left eye: No discharge. Conjunctiva/sclera: Conjunctivae normal.      Pupils: Pupils are equal, round, and reactive to light. Cardiovascular:      Rate and Rhythm: Normal rate and regular rhythm. Heart sounds: Normal heart sounds. No murmur heard. Pulmonary:      Effort: Pulmonary effort is normal. No respiratory distress. Breath sounds: Normal breath sounds. No wheezing, rhonchi or rales. Chest:      Chest wall: No tenderness. Abdominal:      General: Bowel sounds are normal. There is no distension. Palpations: Abdomen is soft. There is no mass. Tenderness: There is no abdominal tenderness. There is no guarding or rebound. Musculoskeletal:         General: Normal range of motion. Cervical back: Normal range of motion and neck supple. Right lower leg: No edema. Left lower leg: No edema. Skin:     General: Skin is warm. Findings: No rash. Neurological:      General: No focal deficit present. Mental Status: She is alert and oriented to person, place, and time. Motor: No abnormal muscle tone.    Psychiatric:         Mood and Affect: Mood normal.         Behavior: Behavior normal.         DIAGNOSTIC RESULTS     EKG: All EKG's are interpreted by the Emergency Department Physician who either signs or Co-signs this chart in the absence of a cardiologist.    none    RADIOLOGY:   Non-plain film images such as CT, Ultrasound and MRI are read by the radiologist.     Interpretation per the Radiologist below, if available at the time of this note:    XR SHOULDER RIGHT (MIN 2 VIEWS)   Final Result   No acute abnormality. ED BEDSIDE ULTRASOUND:   Performed by ED Physician - none    LABS:  Labs Reviewed - No data to display    All other labs were within normal range or not returned as of this dictation. EMERGENCY DEPARTMENT COURSE and DIFFERENTIAL DIAGNOSIS/MDM:   Vitals:    Vitals:    02/20/22 2041 02/20/22 2044   BP: (!) 145/86    Pulse: 82    Resp: 18    Temp:  98.8 °F (37.1 °C)   TempSrc: Oral Oral   SpO2: 99%    Weight: 86.6 kg (191 lb)    Height: 5' 5.5\" (1.664 m)      We did discuss something for pain and some imaging of the shoulder. She is agreeable. Have answered any questions she has at this time. I suspect rotator cuff or biceps tendon injury. We discussed the imaging results and I have made her an appointment for [facility]. CONSULTS:  None    PROCEDURES:  None    FINAL IMPRESSION      1. Acute pain of right shoulder          DISPOSITION/PLAN   DISPOSITION Home      PATIENT REFERRED TO:  No follow-up provider specified.     DISCHARGE MEDICATIONS:  Discharge Medication List as of 2/20/2022  9:32 PM      START taking these medications    Details   cyclobenzaprine (FLEXERIL) 10 MG tablet Take 1 tablet by mouth 3 times daily as needed for Muscle spasms, Disp-21 tablet, R-0Normal             (Please note that portions of this note were completed with a voice recognition program.  Efforts were made to edit the dictations but occasionally words are mis-transcribed.)    Jess Mesa MD  Attending Emergency Physician        Jess Mesa MD  02/21/22 [id number]

## 2022-02-22 ENCOUNTER — TELEPHONE (OUTPATIENT)
Dept: ORTHOPEDIC SURGERY | Age: 47
End: 2022-02-22

## 2022-02-22 NOTE — TELEPHONE ENCOUNTER
LVM with patient to call office back in regards to her upcoming appt with Dr. Phillip Donnelly on 2/23. According to ED note; this is a work related injury. Per Dr. Christin Phillips protocol; patient should be seen by [facility] first if this is a [facility] case for treatment and if necessary then refer to Dr. Phillip Donnelly.

## 2022-02-23 ENCOUNTER — TELEPHONE (OUTPATIENT)
Dept: ORTHOPEDIC SURGERY | Age: 47
End: 2022-02-23

## 2022-02-23 ENCOUNTER — OFFICE VISIT (OUTPATIENT)
Dept: ORTHOPEDIC SURGERY | Age: 47
End: 2022-02-23
Payer: COMMERCIAL

## 2022-02-23 VITALS — WEIGHT: 199 LBS | HEIGHT: 65 IN | BODY MASS INDEX: 33.15 KG/M2

## 2022-02-23 DIAGNOSIS — S46.911A STRAIN OF RIGHT SHOULDER, INITIAL ENCOUNTER: Primary | ICD-10-CM

## 2022-02-23 PROCEDURE — 99203 OFFICE O/P NEW LOW 30 MIN: CPT | Performed by: ORTHOPAEDIC SURGERY

## 2022-02-23 RX ORDER — DICLOFENAC SODIUM 75 MG/1
75 TABLET, DELAYED RELEASE ORAL 2 TIMES DAILY WITH MEALS
Qty: 28 TABLET | Refills: 0 | Status: SHIPPED | OUTPATIENT
Start: 2022-02-23 | End: 2022-03-09

## 2022-02-23 NOTE — TELEPHONE ENCOUNTER
Sal Moore,    This patient is in the works of getting her [facility] claim approved. Dr. Nile Alfaro ordered a CT of her right shoulder with contrast on 2/23/22. Patient was instructed to call the office and speak with you once she has obtained her claim number. She is aware that you cannot submit a C-9 without that number.

## 2022-02-23 NOTE — PROGRESS NOTES
ORTHOPEDIC PATIENT EVALUATION      HPI / Chief Complaint  Rema Devi is a 51 y.o. female who presents for evaluation of her right shoulder. 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 About an hour later she had swelling and increase in pain and could not move this arm and so went to the emergency department for evaluation. She is had persistent pain ever since its fairly constant and diffuse. It does not radiate. She does occasionally have some numbness and tingling in her hand at nighttime. She denies having any problems with the shoulder prior to the incident. She denies having any fevers, chills, sweats or constitutional symptoms. Past Medical History  Rene Back  has a past medical history of Asthma, CAD (coronary artery disease), Diabetes mellitus ([facility] Hypertension, Pacemaker, and Seizures ([facility] Past Surgical History  Rene Back  has a past surgical history that includes Hysterectomy; Appendectomy; Cholecystectomy; Ankle surgery; hip surgery; Wrist surgery; and Pacemaker insertion (04/07/2014). Current Medications  Current Outpatient Medications   Medication Sig Dispense Refill    diclofenac (VOLTAREN) 75 MG EC tablet Take 1 tablet by mouth 2 times daily (with meals) for 14 days 28 tablet 0    cyclobenzaprine (FLEXERIL) 10 MG tablet Take 1 tablet by mouth 3 times daily as needed for Muscle spasms 21 tablet 0    mupirocin (BACTROBAN NASAL) 2 % nasal ointment Take by Nasal route 2 times daily.  1 Tube 0    acetaminophen (TYLENOL) 500 MG tablet Take 2 tablets by mouth every 8 hours as needed for Pain 30 tablet 0    ibuprofen (ADVIL;MOTRIN) 800 MG tablet Take 1 tablet by mouth every 8 hours as needed for Pain 30 tablet 0    Alcohol Swabs (B-D SINGLE USE SWABS REGULAR) PADS       isosorbide mononitrate (IMDUR) 120 MG CR tablet       topiramate (TOPAMAX) 100 MG tablet       venlafaxine (EFFEXOR) 37.5 MG tablet       atorvastatin (LIPITOR) 80 MG tablet       REXULTI 2 MG TABS tablet       carvedilol (COREG) 12.5 MG tablet       FLOVENT DISKUS 100 MCG/BLIST AEPB       hydrOXYzine (ATARAX) 25 MG tablet       ULTICARE SHORT PEN NEEDLES 31G X 8 MM MISC       EASY TOUCH LANCETS 30G/TWIST MISC       levETIRAcetam (KEPPRA) 1000 MG tablet       lidocaine (LIDAMANTLE) 3 % CREA       loratadine (CLARITIN) 10 MG tablet       losartan-hydrochlorothiazide (HYZAAR) 100-25 MG per tablet       magnesium oxide (MAG-OX) 400 (241.3 MG) MG TABS tablet       metFORMIN (GLUCOPHAGE) 500 MG tablet       nadolol (CORGARD) 40 MG tablet       ondansetron (ZOFRAN) 4 MG tablet       oxyCODONE-acetaminophen (PERCOCET) 5-325 MG per tablet       pantoprazole (PROTONIX) 40 MG tablet       JANUMET  MG per tablet       venlafaxine (EFFEXOR-XR) 75 MG XR capsule       Insulin Aspart (NOVOLOG SC) Inject into the skin      meloxicam (MOBIC) 15 MG tablet Take 1 tablet by mouth daily 30 tablet 2    isosorbide mononitrate (IMDUR) 30 MG CR tablet Take 1 tablet by mouth daily 30 tablet 0    beclomethasone (QVAR) 80 MCG/ACT inhaler Inhale 2 puffs into the lungs 2 times daily 1 Inhaler 0    traZODone (DESYREL) 50 MG tablet Take 1 tablet by mouth nightly as needed for Sleep 30 tablet 0    insulin glargine (LANTUS) 100 UNIT/ML injection vial Inject 15 Units into the skin daily 1 vial 0    amLODIPine (NORVASC) 10 MG tablet Take 1 tablet by mouth daily 30 tablet 0    clopidogrel (PLAVIX) 75 MG tablet Take 1 tablet by mouth daily 30 tablet 0    ranolazine (RANEXA) 1000 MG SR tablet Take 500 mg by mouth 2 times daily.  HYDROCHLOROTHIAZIDE PO Take  by mouth daily. No current facility-administered medications for this visit. Allergies  Allergies have been reviewed. Roman Ortega is allergic to aspirin and celebrex [celecoxib].     Social History  Kiffany  reports that she has been smoking e-cigarettes. She uses smokeless tobacco. She reports that she does not drink alcohol and does not use drugs. Family History  Patricia Girffiths family history is not on file. Review of Systems   History obtained from the patient. REVIEW OF SYSTEMS:   Constitution: negative for fever, chills, weight loss or malaise   Musculoskeletal: As noted in the HPI   Neurologic: As noted in the HPI    Physical Exam  Ht 5' 5\" (1.651 m) Comment: per pt  Wt 199 lb (90.3 kg) Comment: per pt  BMI 33.12 kg/m²    General Appearance: alert, well appearing, and in no distress  Mental Status: alert, oriented to person, place, and time  Evaluation the patient's right shoulder and upper extremity demonstrates intact skin without warmth or erythema. Sensation is grossly intact light touch in all dermatomes and she has a 2+ radial pulse with brisk capillary refill in her fingers. Passively she has 55 degrees of shoulder elevation, 75 degrees of abduction, 25 degrees of external rotation internal rotation to her waist.  She has a painful arc of motion. Passively her range of motion is likewise limited due to pain. No gross instability or crepitation noted. She has 5/5 muscle strength with resisted internal and external rotation. She is unable to perform an empty can test due to pain. All other maneuvers are likewise very painful for her. Imaging Studies  X-rays of the right shoulder completed on 2/20/2022 at the emergency department were independently reviewed demonstrating no obvious fracture, dislocation, or subluxation. No notable osseous abnormality. Assessment and Plan  Lam Dodd is a 51 y.o. old female with acute onset right shoulder pain. We had a discussion about possible causes for her pain. At this time her physical examination is severely limited due to the amount of pain that she is end.   I did recommend and place her on a 2-week course of Voltaren which was electronically sent to her pharmacy. I also recommended a CT arthrogram of her shoulder as she is unable to get an MRI due to having a pacemaker. This will help assess for potential soft tissue injury specifically her rotator cuff. In the meantime she was placed on light duty at work. We will call with results and additional treatment recommendations once the study is completed. This note is created with the assistance of a speech recognition program.  While intending to generate adocument that actually reflects the content of the visit, the document can still have some errors including those of syntax and sound a like substitutions which may escape proof reading. It such instances, actual meaningcan be extrapolated by contextual diversion.

## 2022-02-23 NOTE — LETTER
[facility]  [id number] [address] [place] [address] [address] [facility] [address] [address]  Phone: [phone]  Fax: [phone]    Krystal Piper MD        February 23, 2022     Patient: Uriel Martinez   YOB: 1975   Date of Visit: 2/23/2022       To Whom It May Concern: It is my medical opinion that [facility] may return to light duty immediately with the following restrictions: no liftingm pushing, pulling with right arm. If you have any questions or concerns, please don't hesitate to call.     Sincerely,        Krystal Piper MD

## 2022-03-04 ENCOUNTER — TELEPHONE (OUTPATIENT)
Dept: ORTHOPEDIC SURGERY | Age: 47
End: 2022-03-04

## 2022-03-04 NOTE — TELEPHONE ENCOUNTER
LVM notifying pt that the CT for her right shoulder has been approved by [facility].

## 2022-03-07 ENCOUNTER — TELEPHONE (OUTPATIENT)
Dept: ORTHOPEDIC SURGERY | Age: 47
End: 2022-03-07

## 2022-03-07 DIAGNOSIS — M25.511 RIGHT SHOULDER PAIN, UNSPECIFIED CHRONICITY: Primary | ICD-10-CM

## 2022-03-07 NOTE — TELEPHONE ENCOUNTER
Ean Gonzalez from central scheduling called stating there needs to be a correction in patients CT order. See below. Thank you. NEEDS TO BE AN ARTHROGRAM PER [facility] CT DEPT. THIS IS AN IR TEST. OFC TO CORRECT ORDER.  PT AWARE OF DELAY)   [employer] Account:

## 2022-03-14 DIAGNOSIS — M25.511 RIGHT SHOULDER PAIN, UNSPECIFIED CHRONICITY: Primary | ICD-10-CM

## 2022-03-15 ENCOUNTER — HOSPITAL ENCOUNTER (OUTPATIENT)
Dept: CT IMAGING | Age: 47
Discharge: HOME | End: 2022-03-17
Payer: COMMERCIAL

## 2022-03-15 ENCOUNTER — HOSPITAL ENCOUNTER (OUTPATIENT)
Dept: INTERVENTIONAL RADIOLOGY/VASCULAR | Age: 47
Discharge: HOME | End: 2022-03-17
Payer: COMMERCIAL

## 2022-03-15 VITALS
OXYGEN SATURATION: 100 % | HEART RATE: 90 BPM | SYSTOLIC BLOOD PRESSURE: 138 MMHG | DIASTOLIC BLOOD PRESSURE: 86 MMHG | RESPIRATION RATE: 20 BRPM

## 2022-03-15 DIAGNOSIS — S46.911A STRAIN OF RIGHT SHOULDER, INITIAL ENCOUNTER: ICD-10-CM

## 2022-03-15 DIAGNOSIS — M25.511 RIGHT SHOULDER PAIN, UNSPECIFIED CHRONICITY: ICD-10-CM

## 2022-03-15 PROCEDURE — 73201 CT UPPER EXTREMITY W/DYE: CPT

## 2022-03-15 PROCEDURE — 23350 INJECTION FOR SHOULDER X-RAY: CPT

## 2022-03-15 PROCEDURE — 77002 NEEDLE LOCALIZATION BY XRAY: CPT

## 2022-03-15 PROCEDURE — 2709999900 HC NON-CHARGEABLE SUPPLY

## 2022-03-15 PROCEDURE — 6360000004 HC RX CONTRAST MEDICATION: Performed by: ORTHOPAEDIC SURGERY

## 2022-03-15 RX ADMIN — IOHEXOL 12 ML: 240 INJECTION, SOLUTION INTRATHECAL; INTRAVASCULAR; INTRAVENOUS; ORAL at 14:40

## 2022-03-15 NOTE — PROGRESS NOTES
Patient to IR for right shoulder arthrogram.  Dr. Cuco Jo and [address] [place] [address] RTs at bedside. Site prepped and draped, area numbed with lidocaine. Access obtained and 12ml omnipaque injected. Access removed and band aid placed to site. Patient tolerated well and taken to CT for additional imaging.

## 2022-03-15 NOTE — BRIEF OP NOTE
Brief Postoperative Note    Carlos Moreno  YOB: 1975  [id number]    Pre-operative Diagnosis: Right shoulder pain    Post-operative Diagnosis: Same    Procedure: Right shoulder arthrogram for CT    Anesthesia: Local    Surgeons/Assistants: Neel Ron MD    Estimated Blood Loss: none  Complications: None    Specimens: Was Not Obtained    Findings: Successful right shoulder arthrogram.      Electronically signed by CAROLINA Gordon on 3/15/2022 at 2:46 PM

## 2022-04-06 ENCOUNTER — TELEPHONE (OUTPATIENT)
Dept: ORTHOPEDIC SURGERY | Age: 47
End: 2022-04-06

## 2022-04-06 ENCOUNTER — OFFICE VISIT (OUTPATIENT)
Dept: ORTHOPEDIC SURGERY | Age: 47
End: 2022-04-06
Payer: COMMERCIAL

## 2022-04-06 VITALS — RESPIRATION RATE: 12 BRPM | HEIGHT: 65 IN | BODY MASS INDEX: 33.15 KG/M2 | WEIGHT: 199 LBS

## 2022-04-06 DIAGNOSIS — S46.911A STRAIN OF RIGHT SHOULDER, INITIAL ENCOUNTER: Primary | ICD-10-CM

## 2022-04-06 PROCEDURE — 99213 OFFICE O/P EST LOW 20 MIN: CPT | Performed by: ORTHOPAEDIC SURGERY

## 2022-04-06 NOTE — TELEPHONE ENCOUNTER
Markus Hardy, please file a C-9 for PT as well as a right shoulder cortisone injection. We will call the pt once those are approved.

## 2022-04-06 NOTE — LETTER
[facility]  [address]  [address] [address]  Phone: [phone]  Fax: [phone]    Hussein Cronin MD        April 6, 2022     Patient: Karsten Kramer   YOB: 1975   Date of Visit: 4/6/2022       To Whom It May Concern: It is my medical opinion that [facility] may return to work on 4/6/22 and remain on currect restrictions: No lifting, pushing, pulling with right arm. If you have any questions or concerns, please don't hesitate to call.     Sincerely,        Hussein Cronin MD

## 2022-04-06 NOTE — PROGRESS NOTES
HPI: Ms. Avi Mcarthur is a 26-year-old here today to review the results of her CT arthrogram of her right shoulder. She was seen quite a while ago for acute onset pain stemming from an injury sustained at work. I did order the CT arthrogram due to the fact that she was quite painful and exam was limited. I did review the CT scan with the patient today which was completed on 3/15/2022. It demonstrates an intact rotator cuff and labrum. No significant osseous or soft tissue damage. I had a discussion with the patient today about this. She has persistent pain in the shoulder. She really has not undergone any treatment for this except for taking over-the-counter and prescription anti-inflammatories. I did recommend a cortisone injection and physical therapy today but unfortunately this does need to be approved through [employer]. and so a request will be placed. Once we get approval we can get her back for the injection and can give her a prescription for physical therapy. In the meantime we will keep her on light duty at work as she currently is. I have spent 25 minutes face-to-face with the patient more than 50% of this time was spent counseling and coordinating care as outlined above.

## 2022-04-26 ENCOUNTER — HOSPITAL ENCOUNTER (EMERGENCY)
Age: 47
Discharge: HOME | End: 2022-04-26
Attending: EMERGENCY MEDICINE
Payer: COMMERCIAL

## 2022-04-26 ENCOUNTER — APPOINTMENT (OUTPATIENT)
Dept: CT IMAGING | Age: 47
End: 2022-04-26
Payer: COMMERCIAL

## 2022-04-26 ENCOUNTER — TELEPHONE (OUTPATIENT)
Dept: ORTHOPEDIC SURGERY | Age: 47
End: 2022-04-26

## 2022-04-26 VITALS
DIASTOLIC BLOOD PRESSURE: 83 MMHG | TEMPERATURE: 98.2 F | HEIGHT: 65 IN | BODY MASS INDEX: 32.99 KG/M2 | HEART RATE: 78 BPM | RESPIRATION RATE: 16 BRPM | WEIGHT: 198 LBS | SYSTOLIC BLOOD PRESSURE: 143 MMHG | OXYGEN SATURATION: 98 %
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
## 2022-04-26 PROCEDURE — 70450 CT HEAD/BRAIN W/O DYE: CPT

## 2022-04-26 PROCEDURE — 72125 CT NECK SPINE W/O DYE: CPT

## 2022-04-26 PROCEDURE — 6370000000 HC RX 637 (ALT 250 FOR IP): Performed by: EMERGENCY MEDICINE

## 2022-04-26 PROCEDURE — 99284 EMERGENCY DEPT VISIT MOD MDM: CPT

## 2022-04-26 PROCEDURE — 96372 THER/PROPH/DIAG INJ SC/IM: CPT

## 2022-04-26 PROCEDURE — 6360000002 HC RX W HCPCS: Performed by: EMERGENCY MEDICINE

## 2022-04-26 RX ORDER — METOCLOPRAMIDE 10 MG/1
10 TABLET ORAL 4 TIMES DAILY
Qty: 20 TABLET | Refills: 0 | Status: SHIPPED | OUTPATIENT
Start: 2022-04-26

## 2022-04-26 RX ORDER — METOCLOPRAMIDE 10 MG/1
10 TABLET ORAL ONCE
Status: COMPLETED | OUTPATIENT
Start: 2022-04-26 | End: 2022-04-26

## 2022-04-26 RX ORDER — MORPHINE SULFATE 4 MG/ML
4 INJECTION, SOLUTION INTRAMUSCULAR; INTRAVENOUS ONCE
Status: COMPLETED | OUTPATIENT
Start: 2022-04-26 | End: 2022-04-26

## 2022-04-26 RX ADMIN — METOCLOPRAMIDE 10 MG: 10 TABLET ORAL at 20:11

## 2022-04-26 RX ADMIN — MORPHINE SULFATE 4 MG: 4 INJECTION, SOLUTION INTRAMUSCULAR; INTRAVENOUS at 20:11

## 2022-04-26 ASSESSMENT — PAIN - FUNCTIONAL ASSESSMENT: PAIN_FUNCTIONAL_ASSESSMENT: 0-10

## 2022-04-26 ASSESSMENT — PAIN SCALES - GENERAL
PAINLEVEL_OUTOF10: 8
PAINLEVEL_OUTOF10: 8

## 2022-04-27 ENCOUNTER — TELEPHONE (OUTPATIENT)
Dept: ORTHOPEDIC SURGERY | Age: 47
End: 2022-04-27
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
## 2022-04-27 NOTE — TELEPHONE ENCOUNTER
Called pt to notify her that [address] [place] [address] approved her injection and PT. She was advised to schedule PT and she was scheduled to come into the office for her injection.

## 2022-05-04 ENCOUNTER — OFFICE VISIT (OUTPATIENT)
Dept: ORTHOPEDIC SURGERY | Age: 47
End: 2022-05-04
Payer: COMMERCIAL

## 2022-05-04 ENCOUNTER — TELEPHONE (OUTPATIENT)
Dept: ORTHOPEDIC SURGERY | Age: 47
End: 2022-05-04

## 2022-05-04 VITALS — BODY MASS INDEX: 32.99 KG/M2 | WEIGHT: 198 LBS | HEIGHT: 65 IN

## 2022-05-04 DIAGNOSIS — S46.911A STRAIN OF RIGHT SHOULDER, INITIAL ENCOUNTER: Primary | ICD-10-CM

## 2022-05-04 PROCEDURE — 99024 POSTOP FOLLOW-UP VISIT: CPT | Performed by: ORTHOPAEDIC SURGERY

## 2022-05-04 PROCEDURE — 20610 DRAIN/INJ JOINT/BURSA W/O US: CPT | Performed by: ORTHOPAEDIC SURGERY

## 2022-05-04 RX ORDER — LIDOCAINE HYDROCHLORIDE 10 MG/ML
3 INJECTION, SOLUTION INFILTRATION; PERINEURAL ONCE
Status: COMPLETED | OUTPATIENT
Start: 2022-05-04 | End: 2022-05-04

## 2022-05-04 RX ORDER — TRIAMCINOLONE ACETONIDE 40 MG/ML
40 INJECTION, SUSPENSION INTRA-ARTICULAR; INTRAMUSCULAR ONCE
Status: COMPLETED | OUTPATIENT
Start: 2022-05-04 | End: 2022-05-04

## 2022-05-04 RX ADMIN — TRIAMCINOLONE ACETONIDE 40 MG: 40 INJECTION, SUSPENSION INTRA-ARTICULAR; INTRAMUSCULAR at 10:48

## 2022-05-04 RX ADMIN — LIDOCAINE HYDROCHLORIDE 3 ML: 10 INJECTION, SOLUTION INFILTRATION; PERINEURAL at 10:47

## 2022-05-04 NOTE — LETTER
[address]  Phone: [phone]  Fax: [phone]    Maryhelen Bosworth, MD        May 4, 2022     Patient: Gabriela Mims   YOB: 1975   Date of Visit: 5/4/2022       To Whom It May Concern: It is my medical opinion that [facility] should remain on light duty of No lifting, pushing, pulling with right arm for the next six weeks until 6/16/22. If you have any questions or concerns, please don't hesitate to call.     Sincerely,      Maryhelen Bosworth, MD

## 2022-05-04 NOTE — TELEPHONE ENCOUNTER
Markus Hardy, pts light duty restrictions were extended for 6 weeks and a letter was created. Please update for [facility] it needed.

## 2022-05-09 ENCOUNTER — HOSPITAL ENCOUNTER (OUTPATIENT)
Dept: PHYSICAL THERAPY | Facility: CLINIC | Age: 47
Setting detail: THERAPIES SERIES
Discharge: HOME | End: 2022-05-09
Payer: COMMERCIAL

## 2022-05-09 PROCEDURE — 97110 THERAPEUTIC EXERCISES: CPT

## 2022-05-09 PROCEDURE — 97530 THERAPEUTIC ACTIVITIES: CPT

## 2022-05-09 PROCEDURE — 97161 PT EVAL LOW COMPLEX 20 MIN: CPT
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
## 2022-05-09 NOTE — PROGRESS NOTES
HPI: Ms. Arely Griffiths is a 25-year-old here today for her right shoulder cortisone injection which was approved by [employer]. If she was also approved for physical therapy. She had injection administered as outlined below and a prescription for physical therapy provided. I will see her back for reevaluation in 2 months but she may return or call earlier with questions or concerns. Procedure: right shoulder subacromial space injection  Following an appropriate discussion with the patient regarding the risks and benefits of the procedure she consented to proceed. her right shoulder was prepped using chlorhexadine solution. Using aseptic technique and through a posterior approach, her right shoulder subacromial space was injected with a 4 cc mixture of 1cc 40mg/ml kenalog and 3 cc of 1% lidocaine without epinephrine. A band aid was applied to the injection site. she tolerated the injection with no immediate adverse reactions.
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
## 2022-05-12 ENCOUNTER — HOSPITAL ENCOUNTER (OUTPATIENT)
Dept: PHYSICAL THERAPY | Facility: CLINIC | Age: 47
Setting detail: THERAPIES SERIES
Discharge: HOME | End: 2022-05-12
Payer: COMMERCIAL

## 2022-05-12 PROCEDURE — 97016 VASOPNEUMATIC DEVICE THERAPY: CPT

## 2022-05-12 PROCEDURE — 97110 THERAPEUTIC EXERCISES: CPT

## 2022-05-12 NOTE — FLOWSHEET NOTE
[] [facility]  [address]  [phone]  F: [phone] [x] [address]  [facility] 36   [address]  P: [phone]  F: [phone] [] [address]  [address]  P: [phone]  F: [phone] [] [address]  P: [phone]  F: [phone] [] [address]  [facility]   [address]   [place]: [phone]  F: [phone]      Physical Therapy Daily Treatment Note    Date:  2022  Patient Name:  Cecilio Ward   :  1975  MRN: [id number]   Physician: Earle Gr               Insurance: [facility]: [facility], 22 - 22  Medical Diagnosis: Strain of right shoulder, initial encounter                        Rehab Codes:M25.511, M25.611, M62.511, M62.81, R29.3, M79.1  Onset Date: 22                           Next 's appt: unknown  Visit# / total visits:   Cancels/No Shows: 0/0    Subjective:    Pain:  [x] Yes  [] No Location: R shoulder Pain Rating: (0-10 scale) 5/10  Pain altered Tx:  [x] No  [] Yes  Action:  Comments: Pt reports moderate pain upon arrival. Pt reports scattered compliance with HEP due to recent passing of family member. Pt reports kinesiotape helping with pain and would like to be re-taped.      Objective:  Modalities:   Vaso: R shoulder vasocompression min compression 10min with pillow propped under   Kinesiotape: (2) long I strips to R UE      1st I: Anchored just superior to cubital fossa up anterior shoulder into R inferior angle of R scapula     2nd I: From anterolateral aspect of R shoulder into posterolateral shoulder  Precautions:PACEMAKER, Seizures  Exercises: HEP REVIEW - [address] [place], [place] [address] HEP access code  Exercise Reps/ Time Weight/ Level Comments   Pulleys 1min ea   Scaption   Pendulums HEP   CW/CCW/Flex/Ext             Supine         Supine Wand AAROM 10x   Flex. ABD.IR.ER. presses   Scap retraction 10x       PROM+ pos/inf mobs X   PROM in all directions in supine                         Seated         L shoulder table slides 10x   scaption & counter towel slides   Scap retract 10x                 Standing         Pect stretch 2x15\"   45 deg   Wall push up 10x       IR towel stretch  2x15\"       Wall slides 10x   scaption             Tband     theraband added 5/12   Rows 10x lime      Ext 10x lime     ER 10x lime Towel roll   IR 10x lime Towel roll   Scaption HEP                   Trampoline throwing                                       Other: Edu on: importance of AROM to reduce potential for frozen shoulder, HEP review, vasocompression, sleep positions, seated posture/postural awareness, kinesiotape: benefits/wear/removal     Specific Instructions for next treatment: HEP review, R shoulder RTC/Scapular stabilizers/Biceps strengthening, Posterior capsule stretching. , R shoulder AAROM/AROM/PROM & flexibility, & MFR to pects/lats, postural awareness retraining     5/12/22  R shoulder PROM  Inc pain - empty EF   ABD: 90 deg  Flexion: 90 deg  ER: 35 deg  IR: 40deg     Treatment Charges: Mins Units Time off/on   []  Modalities      [x]  Ther Exercise 45 3 4:00-4:45pm   []  Manual Therapy      []  Ther Activities      []  Aquatics      [x]  Vasocompression 10 1 4:46p-4:56p   []  Other      Total Treatment time 55 4 4:00-4:56pm       Assessment: [x] Progressing toward goals. Began tx with warm up on pulleys. Pt with increased pain with OH motion at 90 deg. Pt able to tolerate exercises despite this and hovers around 90 deg with AAROM, as well as with PROM provided by PT. Frequent cues on reducing R UT compensation throughout. Added tband exercises with cues also for postural correction and proper muscular recruitment.  Retaped with kinesiotape, as charted above & ended tx with vasocompression to address pain. Updated HEP and gave pt lime theraband for 4way shoulder. Post-tx pt reports she has no pain and is numb from the ice. [] No change. [] Other:  [x] Pt would benefit from skilled PT services: to improve R shoulder AROM, stabilization & strengthening to improve overall pain-free function.     STG: (to be met in 9 treatments)          1. ? Pain: to <7/10  for ease with work & ADLs.            2. ? Strength: R shoulder by 1/2 to a full grade throughout to improve posture & lifting ability for work & ADLs.  .           3. Independent with Home Exercise Programs          6. ? Sleep Function: Pt to report 5 hours of uninterrupted sleep to improve quality of life & mental acuity at work.  [employer] 5. ? AROM:  R shoulder Flex/ABD/ER/IR by 10-15 degrees to improve work & ADLs.            6. Demonstrate Knowledge of fall prevention                                LTG: (to be met in 18 treatments)         1.  ? Pain: to <2/10 for ease with work & ADLs.  .    2. ? Strength: R shoulder to 4+/5 throughout to improve posture & lifting ability for work & ADLs.     3.  ? Function: Decrease UEFS score to less than 15% deficit to demo improved R shoulder function at work.    4. ? AROM: R shoulder Flex/ABD/ER/IR to WNLs to improve work & ADLs.  .   5. Pt will demo R  strength of 70lbs or more to improve gripping function with work & ADLs. 6.  Pt will report being able to do OH ADLs with RUE with 90% ease and improvement of sxs with work & ADLs.       Patient goals: \"Get R shoulder back the way it was & return to work & regular function \"    Pt. Education:  [x] Yes  [] No  [x] Reviewed Prior HEP/Ed  Method of Education: [x] Verbal  [x] Demo  [x] Written - lime band given  Access Code: [email or web address]  URL: [email or web address]  Date: 05/12/2022  Prepared by: Nichole Mediate    Program Notes  ktape - 4-5 days or longer if intact & not itchy - pressure/pull in increments to avoid skin tearing    Exercises  Seated Scapular Retraction - 1 x daily - 7 x weekly - 3 sets - 10 reps  Supine Shoulder Flexion Extension AAROM with Dowel - 1 x daily - 7 x weekly - 2 sets - 10 reps  Supine Shoulder External Rotation with Dowel - 1 x daily - 7 x weekly - 2 sets - 10 reps  Supine Shoulder Abduction AAROM with Dowel - 1 x daily - 7 x weekly - 2 sets - 10 reps  Supine Shoulder Press - 2 x daily - 2 sets - 10 reps  Seated Shoulder Flexion Towel Slide at Table Top - 1 x daily - 7 x weekly - 2 sets - 10 reps  Doorway Pec Stretch at 60 Elevation - 1 x daily - 7 x weekly - 2 sets - 10 reps  Shoulder Flexion Wall Slide with Towel - 1 x daily - 7 x weekly - 3 sets - 10 reps  Circular Shoulder Pendulum with Table Support - 2 x daily - 1 sets - 10 reps  Horizontal Shoulder Pendulum with Table Support - 1 x daily - 7 x weekly - 3 sets - 10 reps  Flexion-Extension Shoulder Pendulum with Table Support - 1 x daily - 7 x weekly - 3 sets - 10 reps  Scapular Retraction with Resistance - 1 x daily - 7 x weekly - 10 reps - 3 sets  Shoulder Extension with Resistance - 1 x daily - 7 x weekly - 10 reps - 3 sets  Shoulder Internal Rotation with Resistance - 1 x daily - 7 x weekly - 10 reps - 3 sets  Shoulder External Rotation with Anchored Resistance - 1 x daily - 7 x weekly - 10 reps - 3 sets  Shoulder External Rotation and Scapular Retraction with Resistance - 1 x daily - 7 x weekly - 10 reps - 3 sets  Patient Education  Shoulder Impingement  Rotator Cuff Tendinopathy  Sleep Positions  Ice  Heat  Office Posture  Lifting Techniques  Household Activities  Comprehension of Education:  [x] Verbalizes understanding. [x] Demonstrates understanding. [x] Needs review. [] Demonstrates/verbalizes HEP/Ed previously given. Plan: [x] Continue current frequency toward long and short term goals.     [x] Specific Instructions for subsequent treatments: see above      Time In:4p            Time Out:504p    Electronically signed by:  Kennedy Kruse Sinjeremiah, PT

## 2022-05-16 ENCOUNTER — HOSPITAL ENCOUNTER (OUTPATIENT)
Dept: PHYSICAL THERAPY | Facility: CLINIC | Age: 47
Setting detail: THERAPIES SERIES
Discharge: HOME | End: 2022-05-16
Payer: COMMERCIAL

## 2022-05-16 PROCEDURE — 97110 THERAPEUTIC EXERCISES: CPT

## 2022-05-16 NOTE — FLOWSHEET NOTE
[] [facility]  [address]  [phone]  F: [phone] [x] [address]  [facility] 36   [address]  P: [phone]  F: [phone] [] [address]  [address] [facility]  P: [phone]  F: [phone] [] [address]  P: [phone]  F: [phone] [] [address]  [facility]   [address]   [place]: [phone]  F: [phone]      Physical Therapy Daily Treatment Note    Date:  2022  Patient Name:  Ty Starks   :  1975  MRN: [id number]   Physician: 1 [facility]               Insurance: [address] [place] [address] Street: [facility], 22 - 22  Medical Diagnosis: Strain of right shoulder, initial encounter                        Rehab Codes:M25.511, M25.611, M62.511, M62.81, R29.3, M79.1  Onset Date: 22                           Next 's appt: unknown  Visit# / total visits: 3/18  Cancels/No Shows: 0/0    Subjective:    Pain:  [x] Yes  [] No Location: R shoulder Pain Rating: (0-10 scale) 5/10  Pain altered Tx:  [x] No  [] Yes  Action:  Comments: Pt reports no change in sx's. Mentions she tries to complete some of HEP every day. Reports feeling 'real sore' after last PT session.      Objective:  Modalities: Trial MHP to R shoulder in seated x10'  Not today Vaso: R shoulder vasocompression min compression 10min with pillow propped under   Not today Kinesiotape: (2) long I strips to R UE         1st I: Anchored just superior to cubital fossa up anterior shoulder into R inferior angle of R scapula     2nd I: From anterolateral aspect of R shoulder into posterolateral shoulder  Precautions:PACEMAKER, Seizures  Exercises: [facility] - [facility]  Exercise Reps/ Time Weight/ Level Comments Pulleys 1min ea   Scaption   Pendulums HEP   CW/CCW/Flex/Ext             Supine         Supine Wand AAROM 10x   Flex. ABD.IR.ER. presses   Scap retraction 10x       PROM+ pos/inf mobs X   PROM in all directions in supine     serratus punch  10x    added 5/16             Seated         R shoulder table slides 10x   scaption    Scap retract 10x5\"                Standing         Pect stretch 3x15\"   45 deg   R UT stretch  3x15\"  Added 5/16   Posterior capsule stretch  3x15\"  Added 5/16   Wall push up 10x       IR towel stretch  3x15\"       Wall slides 10x3\"   scaption   Wall circles  10xea  Cw, ccw; added 5/16   Ball on wall  10x ea Ball  A/P, M/L, cw, ccw             Tband     theraband added 5/12   Rows 10x lime      Ext 10x lime     ER 10x lime Towel roll   IR 10x lime Towel roll   Bicep curls  10x2 Lime  Added 5/16   Scaption HEP                   Trampoline throwing 4x  1.1# tan ball  chest pass; added 5/16- attempted- painful                                Other: Edu on: importance of AROM to reduce potential for frozen shoulder, HEP review, vasocompression, sleep positions, seated posture/postural awareness, kinesiotape: benefits/wear/removal     Specific Instructions for next treatment: HEP review, R shoulder RTC/Scapular stabilizers/Biceps strengthening, Posterior capsule stretching., R shoulder AAROM/AROM/PROM & flexibility, & MFR to pects/lats, postural awareness retraining     5/12/22  R shoulder PROM  Inc pain - empty EF   ABD: 90 deg  Flexion: 90 deg  ER: 35 deg  IR: 40deg     Treatment Charges: Mins Units Time off/on   [x]  Modalities P 10 -- 6:45-6:55   [x]  Ther Exercise 45 3 6:00-6:45    []  Manual Therapy      []  Ther Activities      []  Aquatics      [x]  Vasocompression      []  Other      Total Treatment time 45 3 6:00-6:45       Assessment: [x] Progressing toward goals. Grimaces of pain at end ranges during PROM. Cueing as needed to reduce UT compensation this date.  Added UT stretch and posterior capsule stretch to improve ms flexibility. Progressed scapular stabilizer strengthening with addition of serratus punches and ball on wall exercise. Attempted chest pass to rebounder, however, increased pain this date. Added resisted bicep curls with good tolerance. Ended with trial of MHP to reduce pain and manage sx's as pt prefers heat to cold. [] No change. [] Other:  [x] Pt would benefit from skilled PT services: to improve R shoulder AROM, stabilization & strengthening to improve overall pain-free function. STG: (to be met in 9 treatments)          1. ? Pain: to <7/10  for ease with work & ADLs. 2. ? Strength: R shoulder by 1/2 to a full grade throughout to improve posture & lifting ability for work & ADLs. .           3. Independent with Home Exercise Programs          4. ? Sleep Function: Pt to report 5 hours of uninterrupted sleep to improve quality of life & mental acuity at work. 5. ? AROM:  R shoulder Flex/ABD/ER/IR by 10-15 degrees to improve work & ADLs. 6. Demonstrate Knowledge of fall prevention                                LTG: (to be met in 18 treatments)         1.  ? Pain: to <2/10 for ease with work & ADLs. .    2. ? Strength: R shoulder to 4+/5 throughout to improve posture & lifting ability for work & ADLs. 3.  ? Function: Decrease UEFS score to less than 15% deficit to demo improved R shoulder function at work. 4. ? AROM: R shoulder Flex/ABD/ER/IR to WNLs to improve work & ADLs. .   5. Pt will demo R  strength of 70lbs or more to improve gripping function with work & ADLs. 6.  Pt will report being able to do [place] ADLs with RUE with 90% ease and improvement of sxs with work & ADLs. Patient goals: \"Get R shoulder back the way it was & return to work & regular function \"    Pt.  Education:  [x] Yes  [] No  [x] Reviewed Prior HEP/Ed  Method of Education: [x] Verbal  [x] Demo  [] Written   Access Code: [email or web address]  URL: frequency toward long and short term goals.     [x] Specific Instructions for subsequent treatments: see above      Time In: 6:00 p           Time Out: 6:55p    Electronically signed by:  Armen Casanova PTA

## 2022-05-18 ENCOUNTER — HOSPITAL ENCOUNTER (OUTPATIENT)
Dept: PHYSICAL THERAPY | Facility: CLINIC | Age: 47
Setting detail: THERAPIES SERIES
Discharge: HOME | End: 2022-05-18
Payer: COMMERCIAL

## 2022-05-18 NOTE — FLOWSHEET NOTE
[] [facility]) - [facility]  [facility]  [address]    [phone]  F: [phone]   [x] [address]  [facility] 36   [address]  P: [phone]  F: [phone]  [] [address]  P: [phone]  F: [phone] [] [address]  P: [phone]  F: [phone]  [] [address]  [address] [address] [facility]   [address]   [place]: [phone]  F: [phone]   [] [address]  [phone]   F: [phone]     Physical Therapy Cancel/No Show note    Date: 2022  Patient: Krista Pearce  : 1975  MRN: [id number]    Cancels/No Shows to date:     For today's appointment patient:    [x]  Cancelled    [] Rescheduled appointment    [] No-show     Reason given by patient:    []  Patient ill    []  Conflicting appointment    [] No transportation      [] Conflict with work    [] No reason given    [] Weather related    [] COVID-19    [x] Other:      Comments: Patient called 20 minutes after her appointment time stating that she was stuck in traffic and wouldn't make much of her appointment       [x] Next appointment was confirmed    Electronically signed by: Isadora Hutson, PTA

## 2022-05-19 ENCOUNTER — APPOINTMENT (OUTPATIENT)
Dept: PHYSICAL THERAPY | Facility: CLINIC | Age: 47
End: 2022-05-19
Payer: COMMERCIAL

## 2022-05-26 ENCOUNTER — HOSPITAL ENCOUNTER (OUTPATIENT)
Dept: PHYSICAL THERAPY | Facility: CLINIC | Age: 47
Setting detail: THERAPIES SERIES
Discharge: HOME | End: 2022-05-26
Payer: COMMERCIAL

## 2022-05-26 PROCEDURE — 97110 THERAPEUTIC EXERCISES: CPT

## 2022-05-26 NOTE — FLOWSHEET NOTE
[] [facility]  [address]  [phone]  F: [phone] [x] [address]  [facility]   [address]  P: [phone]  F: [phone] [] [address]  [address] [facility] [address]  P: [phone]  F: [phone] [] [address]  P: [phone]  F: [phone] [] [address]  [facility]   [address]   [place]: [phone]  F: [phone]      Physical Therapy Daily Treatment Note    Date:  2022  Patient Name:  Maik Thomspon   :  1975  MRN: [id number]   Physician: Elvira Jordan               Insurance: [facility]: 18, 22 - 22  Medical Diagnosis: Strain of right shoulder, initial encounter                        Rehab Codes:M25.511, M25.611, M62.511, M62.81, R29.3, M79.1  Onset Date: 22                           Next 's appt: unknown  Visit# / total visits:   Cancels/No Shows:     Subjective:    Pain:  [x] Yes  [] No Location: R shoulder Pain Rating: (0-10 scale) 5-6/10  Pain altered Tx:  [x] No  [] Yes  Action:  Comments: Pt arrives noting pain at anterior aspect of R shoulder. Reports compliance to HEP.      Objective:  Modalities: Trial MHP to R shoulder in seated x10' not today  Not today Vaso: R shoulder vasocompression min compression 10min with pillow propped under   Not today D/C Kinesiotape: (2) long I strips to R UE         1st I: Anchored just superior to cubital fossa up anterior shoulder into R inferior angle of R scapula     2nd I: From anterolateral aspect of R shoulder into posterolateral shoulder  Precautions:PACEMAKER, Seizures  Exercises: [facility]VIEW - [facility] access code  Exercise Reps/ Time Weight/ Level Comments   Pulleys 1min    Scaption   Pendulums HEP   CW/CCW/Flex/Ext             Supine         Supine Wand AAROM 10x   Flex. ABD.IR.ER.scaption. HAB. presses; progressed 5/26   Scap retraction        PROM+ pos/inf mobs X   PROM in all directions in supine     serratus punch  10x    added 5/16   Rhythmic stabs add               Seated         R shoulder table slides    scaption    Scap retract 10x5\"                Standing         Pect stretch 3x15\"   45 deg   R UT stretch  3x15\"  Added 5/16   Posterior capsule stretch  3x15\"  Added 5/16   Wall push up w/ a plus 10x    added 'plus' 5/26   IR towel stretch  3x15\"       Wall slides 10x3\"   scaption   Wall circles  10xea  Cw, ccw; added 5/16   Ball on wall  10x ea 1.1# Ball  A/P, M/L, cw, ccw; added weighted ball 5/26   Resisted serratus slides  10x Lime  Added 5/26   Bicep curls 3 way 10xea 2# Added 5/26             Tband     theraband added 5/12; progressed reps 5/26   Rows 15x lime      Ext 15x lime     ER 15x lime Towel roll   IR 15x lime Towel roll   Bicep curls  10x2 Lime  Added 5/16   Scaption HEP                   Trampoline throwing   1.1# tan ball  chest pass; added 5/16- attempted- painful                                Other: Edu on: importance of AROM to reduce potential for frozen shoulder, HEP review, vasocompression, sleep positions, seated posture/postural awareness, kinesiotape: benefits/wear/removal     Specific Instructions for next treatment: HEP review, R shoulder RTC/Scapular stabilizers/Biceps strengthening, Posterior capsule stretching., R shoulder AAROM/AROM/PROM & flexibility, & MFR to pects/lats, postural awareness retraining     5/12/22  R shoulder PROM  Inc pain - empty EF   ABD: 90 deg  Flexion: 90 deg  ER: 35 deg  IR: 40deg     Treatment Charges: Mins Units Time    []  Modalities MHP      [x]  Ther Exercise 45 3 6:03-6:48    []  Manual Therapy      []  Ther Activities      []  Aquatics      [x]  Vasocompression      []  Other      Total Treatment time 45 3 6:03-6:48 Assessment: [x] Progressing toward goals. Pt mentions she removed k tape 2 days ago because it was itchy and still has markings from tape on her skin. Notes her skin is very sensitive and may have had a reaction but denies any pain related. Encouraged to cleanse area with soap and water and to let markings heal. Progressed reps for t band strengthening this date. Added resisted serratus slides with cueing to ensure proper form. Implemented bicep curls completed 3 ways to challenge strength. Some irritation present with wall circles and wall push ups this date. Grimaces of pain during end ranges with PROM. Emphasized importance of ROM exercises at home. Encouraged to apply heat at home to reduce sx's.     [] No change. [] Other:  [x] Pt would benefit from skilled PT services: to improve R shoulder AROM, stabilization & strengthening to improve overall pain-free function. STG: (to be met in 9 treatments)          1. ? Pain: to <7/10  for ease with work & ADLs. 2. ? Strength: R shoulder by 1/2 to a full grade throughout to improve posture & lifting ability for work & ADLs. .           3. Independent with Home Exercise Programs          4. ? Sleep Function: Pt to report 5 hours of uninterrupted sleep to improve quality of life & mental acuity at work. 5. ? AROM:  R shoulder Flex/ABD/ER/IR by 10-15 degrees to improve work & ADLs. 6. Demonstrate Knowledge of fall prevention                                LTG: (to be met in 18 treatments)         1.  ? Pain: to <2/10 for ease with work & ADLs. 2. ? Strength: R shoulder to 4+/5 throughout to improve posture & lifting ability for work & ADLs. 3.  ? Function: Decrease UEFS score to less than 15% deficit to demo improved R shoulder function at work. 4. ? AROM: R shoulder Flex/ABD/ER/IR to WNLs to improve work & ADLs. .   5. Pt will demo R  strength of 70lbs or more to improve gripping function with work & ADLs.    6.  Pt will report being able to do [place] ADLs with RUE with 90% ease and improvement of sxs with work & ADLs. Patient goals: \"Get R shoulder back the way it was & return to work & regular function\"    Pt. Education:  [x] Yes  [] No  [x] Reviewed Prior HEP/Ed  Method of Education: [x] Verbal  [x] Demo  [] Written   Access Code: [email or web address]  URL: [email or web address]  Date: 05/12/2022  Prepared by: Hill Flank    Program Notes  ktape - 4-5 days or longer if intact & not itchy - pressure/pull in increments to avoid skin tearing    Exercises  Seated Scapular Retraction - 1 x daily - 7 x weekly - 3 sets - 10 reps  Supine Shoulder Flexion Extension AAROM with Dowel - 1 x daily - 7 x weekly - 2 sets - 10 reps  Supine Shoulder External Rotation with Dowel - 1 x daily - 7 x weekly - 2 sets - 10 reps  Supine Shoulder Abduction AAROM with Dowel - 1 x daily - 7 x weekly - 2 sets - 10 reps  Supine Shoulder Press - 2 x daily - 2 sets - 10 reps  Seated Shoulder Flexion Towel Slide at Table Top - 1 x daily - 7 x weekly - 2 sets - 10 reps  Doorway Pec Stretch at 60 Elevation - 1 x daily - 7 x weekly - 2 sets - 10 reps  Shoulder Flexion Wall Slide with Towel - 1 x daily - 7 x weekly - 3 sets - 10 reps  Circular Shoulder Pendulum with Table Support - 2 x daily - 1 sets - 10 reps  Horizontal Shoulder Pendulum with Table Support - 1 x daily - 7 x weekly - 3 sets - 10 reps  Flexion-Extension Shoulder Pendulum with Table Support - 1 x daily - 7 x weekly - 3 sets - 10 reps  Scapular Retraction with Resistance - 1 x daily - 7 x weekly - 10 reps - 3 sets  Shoulder Extension with Resistance - 1 x daily - 7 x weekly - 10 reps - 3 sets  Shoulder Internal Rotation with Resistance - 1 x daily - 7 x weekly - 10 reps - 3 sets  Shoulder External Rotation with Anchored Resistance - 1 x daily - 7 x weekly - 10 reps - 3 sets  Shoulder External Rotation and Scapular Retraction with Resistance - 1 x daily - 7 x weekly - 10 reps - 3 sets  Patient Education  Shoulder Impingement  Rotator Cuff Tendinopathy  Sleep Positions  Ice  Heat  Office Posture  Lifting Techniques  Household Activities  Comprehension of Education:  [x] Verbalizes understanding. [x] Demonstrates understanding. [x] Needs review. [] Demonstrates/verbalizes HEP/Ed previously given. Plan: [x] Continue current frequency toward long and short term goals.     [x] Specific Instructions for subsequent treatments: see above      Time In: 6:03 p           Time Out: 6:48 p    Electronically signed by:  Shania Colon PTA

## 2022-05-31 ENCOUNTER — APPOINTMENT (OUTPATIENT)
Dept: PHYSICAL THERAPY | Facility: CLINIC | Age: 47
End: 2022-05-31
Payer: COMMERCIAL

## 2022-06-02 ENCOUNTER — HOSPITAL ENCOUNTER (OUTPATIENT)
Dept: PHYSICAL THERAPY | Facility: CLINIC | Age: 47
Setting detail: THERAPIES SERIES
Discharge: HOME | End: 2022-06-02
Payer: COMMERCIAL

## 2022-06-02 PROCEDURE — 97110 THERAPEUTIC EXERCISES: CPT

## 2022-06-02 NOTE — FLOWSHEET NOTE
[] [facility]  [address]  [phone]  F: [phone] [x] [address]  [facility] 36   [address]  P: [phone]  F: [phone] [] [address]  [address] [facility] [address]  P: [phone]  F: [phone] [] [address]  P: [phone]  F: [phone] [] [address]  [facility]   [address]   [place]: [phone]  F: [phone]      Physical Therapy Daily Treatment Note    Date:  2022  Patient Name:  Ty Starks   :  1975  MRN: [id number]   Physician: Yunior Gr               Insurance: [facility]: 18, 22 - 22  Medical Diagnosis: Strain of right shoulder, initial encounter                        Rehab Codes:M25.511, M25.611, M62.511, M62.81, R29.3, M79.1  Onset Date: 22                           Next 's appt: unknown  Visit# / total visits:   Cancels/No Shows:     Subjective:    Pain:  [x] Yes  [] No Location: R shoulder Pain Rating: (0-10 scale) 4-5/10  Pain altered Tx:  [x] No  [] Yes  Action:  Comments: Presents with slight reduction in pain levels this date. Reports relief with pendulum exercises and completes them in the shower letting hot water run on her shoulder. Notes she completes a few exercises each day.      Objective:  Modalities: MHP to R shoulder in seated at end of session x10'  Not today Vaso: R shoulder vasocompression min compression 10min with pillow propped under   Not today D/C Kinesiotape: (2) long I strips to R UE         1st I: Anchored just superior to cubital fossa up anterior shoulder into R inferior angle of R scapula     2nd I: From anterolateral aspect of R shoulder into posterolateral shoulder  Precautions:PACEMAKER, Seizures  Exercises: [facility] - [facility] access code  Exercise Reps/ Time Weight/ Level Comments   UBE 2' ea  Added 6/2; fwd/retro    Pulleys 1' ea   Scaption, flex   Pendulums HEP   CW/CCW/Flex/Ext             Supine         Supine Wand AAROM 10x  2# wand  Flex. ABD.IR.ER.scaption. HAB. presses; progressed 5/26; increased wt 6/2   Scap retraction        PROM+ pos/inf mobs X   PROM in all directions in supine     serratus punch  10x2  A  added 5/16; added set 6/2   Rhythmic stabs 3x15\"  Added 6/2             Seated         R shoulder table slides    scaption    Scap retract 10x5\"             Side lying    Added 6/2   ER 10x     Abd  10x     HAB 10x                    Standing         Pect stretch 3x15\"   45 deg   R UT stretch  3x15\"  Added 5/16   Posterior capsule stretch  3x15\"  Added 5/16   Wall push up w/ a plus 10x    added 'plus' 5/26   IR towel stretch  3x15\"    (not today)   Wall slides 10x3\"   Flex 6/2   Wall circles  10xea  Cw, ccw; added 5/16   Ball on wall  10x ea 1.1# Ball  A/P, M/L, cw, ccw; added weighted ball 5/26 (not today)   Resisted serratus slides  10x Lime  Added 5/26 (not today)   Bicep curls 3 way 10xea 2# Added 5/26             Tband     theraband added 5/12; progressed reps 5/26   Rows 15x lime      Ext 15x lime     ER 15x lime Towel roll   IR 15x lime Towel roll   Bicep curls  10x2 Blue   Added 5/16; increased resistance 6/2                      Trampoline throwing   1.1# tan ball  chest pass; added 5/16- attempted- painful                                Other:      Specific Instructions for next treatment: Measure PROM & AROM, HEP review, R shoulder RTC/Scapular stabilizers/Biceps strengthening, Posterior capsule stretching., R shoulder AAROM/AROM/PROM & flexibility, & MFR to pects/lats, postural awareness retraining     5/12/22  R shoulder PROM  Inc pain - empty EF   ABD: 90 deg  Flexion: 90 deg  ER: 35 deg  IR: 40deg     Treatment Charges: Mins Units Time    [x]  Modalities [facility] 10 -- 4:50-5:00 [x]  Ther Exercise 40 3 4:10-4:50   []  Manual Therapy      []  Ther Activities      []  Aquatics      [x]  Vasocompression      []  Other      Total Treatment time 40 3 4:10-4:50   UBE 4:06-4:10 not billed for     Assessment: [x] Progressing toward goals. Implemented warm up on UBE this date to promote increased blood flow and endurance. Displays grimaces of pain with PROM end ranges and with mobs this date. Most limited into abduction. Increased reps for serratus punches and added rhythmic stabs to improve scapular strength and stabilization. Added shoulder strengthening in side lying this date. Increased resistance for bicep curls to improve strength. Pt notes shoulder feels heavy and is painful. Ended with HP to R shoulder in seated to reduce pain and relax musculature. Encouraged to add more exercises into her daily routine to improve tolerance to exercises. Will monitor sx's progressing strength and ROM to tolerance. [] No change. [] Other:  [x] Pt would benefit from skilled PT services: to improve R shoulder AROM, stabilization & strengthening to improve overall pain-free function. STG: (to be met in 9 treatments)          1. ? Pain: to <7/10  for ease with work & ADLs. 2. ? Strength: R shoulder by 1/2 to a full grade throughout to improve posture & lifting ability for work & ADLs. .           3. Independent with Home Exercise Programs          4. ? Sleep Function: Pt to report 5 hours of uninterrupted sleep to improve quality of life & mental acuity at work. 5. ? AROM:  R shoulder Flex/ABD/ER/IR by 10-15 degrees to improve work & ADLs. 6. Demonstrate Knowledge of fall prevention                                LTG: (to be met in 18 treatments)         1.  ? Pain: to <2/10 for ease with work & ADLs. 2. ? Strength: R shoulder to 4+/5 throughout to improve posture & lifting ability for work & ADLs.     3.  ? Function: Decrease UEFS score to less than 15% deficit to demo improved R shoulder function at work. 4. ? AROM: R shoulder Flex/ABD/ER/IR to WNLs to improve work & ADLs. .   5. Pt will demo R  strength of 70lbs or more to improve gripping function with work & ADLs. 6.  Pt will report being able to do [place] ADLs with RUE with 90% ease and improvement of sxs with work & ADLs. Patient goals: \"Get R shoulder back the way it was & return to work & regular function\"    Pt. Education:  [x] Yes  [] No  [x] Reviewed Prior HEP/Ed  Method of Education: [x] Verbal more ex's each day  [x] Demo  [] Written   Access Code: [email or web address]  URL: [email or web address]  Date: 05/12/2022  Prepared by: Lory Richmond    Program Notes  ktape - 4-5 days or longer if intact & not itchy - pressure/pull in increments to avoid skin tearing    Exercises  Seated Scapular Retraction - 1 x daily - 7 x weekly - 3 sets - 10 reps  Supine Shoulder Flexion Extension AAROM with Dowel - 1 x daily - 7 x weekly - 2 sets - 10 reps  Supine Shoulder External Rotation with Dowel - 1 x daily - 7 x weekly - 2 sets - 10 reps  Supine Shoulder Abduction AAROM with Dowel - 1 x daily - 7 x weekly - 2 sets - 10 reps  Supine Shoulder Press - 2 x daily - 2 sets - 10 reps  Seated Shoulder Flexion Towel Slide at Table Top - 1 x daily - 7 x weekly - 2 sets - 10 reps  Doorway Pec Stretch at 60 Elevation - 1 x daily - 7 x weekly - 2 sets - 10 reps  Shoulder Flexion Wall Slide with Towel - 1 x daily - 7 x weekly - 3 sets - 10 reps  Circular Shoulder Pendulum with Table Support - 2 x daily - 1 sets - 10 reps  Horizontal Shoulder Pendulum with Table Support - 1 x daily - 7 x weekly - 3 sets - 10 reps  Flexion-Extension Shoulder Pendulum with Table Support - 1 x daily - 7 x weekly - 3 sets - 10 reps  Scapular Retraction with Resistance - 1 x daily - 7 x weekly - 10 reps - 3 sets  Shoulder Extension with Resistance - 1 x daily - 7 x weekly - 10 reps - 3 sets  Shoulder Internal Rotation with Resistance - 1 x daily - 7 x weekly - 10 reps - 3 sets  Shoulder External Rotation with Anchored Resistance - 1 x daily - 7 x weekly - 10 reps - 3 sets  Shoulder External Rotation and Scapular Retraction with Resistance - 1 x daily - 7 x weekly - 10 reps - 3 sets  Patient Education  Shoulder Impingement  Rotator Cuff Tendinopathy  Sleep Positions  Ice  Heat  Office Posture  Lifting Techniques  Household Activities  Comprehension of Education:  [x] Verbalizes understanding. [x] Demonstrates understanding. [x] Needs review. FOR COMPLIANCE   [] Demonstrates/verbalizes HEP/Ed previously given. Plan: [x] Continue current frequency toward long and short term goals.     [x] Specific Instructions for subsequent treatments: see above      Time In: 4:06 p           Time Out: 5:00 p    Electronically signed by:  Hiral Nayak PTA

## 2022-06-10 ENCOUNTER — HOSPITAL ENCOUNTER (OUTPATIENT)
Dept: PHYSICAL THERAPY | Facility: CLINIC | Age: 47
Setting detail: THERAPIES SERIES
Discharge: HOME | End: 2022-06-10
Payer: COMMERCIAL

## 2022-06-10 PROCEDURE — 97110 THERAPEUTIC EXERCISES: CPT

## 2022-06-10 NOTE — FLOWSHEET NOTE
code Bolded completed 6/10  Exercise Reps/ Time Weight/ Level Comments   UBE 2' ea  Added 6/2; fwd/retro    Pulleys 1' ea   Scaption, flex   Pendulums HEP   CW/CCW/Flex/Ext             Supine         Supine Wand AAROM 10x  2# wand  Flex. ABD.IR.ER.scaption. HAB. presses; progressed 5/26; increased wt 6/2   Scap retraction        PROM+ pos/inf mobs X   PROM in all directions in supine-guarded, empty end feels 6/10     serratus punch  10x2  A  added 5/16; added set 6/2   Rhythmic stabs 3x15\"  Added 6/2             Seated         R shoulder table slides    scaption    Scap retract 10x5\"             Side lying    Added 6/2   ER 15x     Abd  15x     HAB 15x                    Standing         Pect stretch 3x15\"   45 deg   R UT stretch  3x15\"  Added 5/16   Posterior capsule stretch  3x15\"  Added 5/16   Wall push up w/ a plus 10x    added 'plus' 5/26   IR towel stretch  3x15\"    (not today)   Wall slides 10x3\"   Flex 6/2   Wall circles  10xea  Cw, ccw; added 5/16   Ball on wall  10x ea 1.1# Ball  A/P, M/L, cw, ccw; added weighted ball 5/26 (not today)   Resisted serratus slides  10x Lime  Added 5/26 (not today)   Bicep curls 3 way 10xea 2# Added 5/26             Tband     theraband added 5/12; progressed reps 5/26   Protraction 20 lime Added 6/10   Rows 15x lime      Ext 15x lime     ER 15x lime Towel roll   IR 15x lime Towel roll   Bicep curls  10x2 Blue   Added 5/16; increased resistance 6/2                      Trampoline throwing   1.1# tan ball  chest pass; added 5/16- attempted- painful                                Other: Pt guarded throughout ROM, with empty end feels.  Pt admits that she does not complete her abd/ER based HEP because they are so painful     Specific Instructions for next treatment: Measure PROM & AROM, HEP review, R shoulder RTC/Scapular stabilizers/Biceps strengthening, Posterior capsule stretching., R shoulder AAROM/AROM/PROM & flexibility, & MFR to pects/lats, postural awareness retraining 6/10/22  R Shldr AROM/PROM  All empty end feels  Seated flex: 136 AROM  Abduction: 92 *w/substitution 5/12/22  R shoulder PROM  Inc pain - empty EF   Supine AROM/PROM  Flex: 129/135  Abd: 75AAROM  IR: 62/68  ER: 60/65 ABD: 90 deg  Flexion: 90 deg  ER: 35 deg  IR: 40deg     Treatment Charges: Mins Units Time    []  Modalities MHP      [x]  Ther Exercise 40 3 9:35-10:20   []  Manual Therapy      []  Ther Activities      []  Aquatics      []  Vasocompression      []  Other      Total Treatment time 40 3 9:35-10:20   UBE 9:30-9:34 not billed for     Assessment: [x] Progressing toward goals. Pt to see Dr Woody Mccoy today immediately following therapy. She reports little overall change in sx. HEP completion aside from abd/ER/IR based exercises-done 1x daily as long as work schedule allows. Sleep interrupted due to difficulty finding a comfortable position. Pt reports painful popping several times throughout session today with ER based exercises. Bolded measurements/exs/comments today for ease of physician reference as primary PT not available to complete note fpr physician appointment    [] No change. [] Other:  [x] Pt would benefit from skilled PT services: to improve R shoulder AROM, stabilization & strengthening to improve overall pain-free function. STG: (to be met in 9 treatments)          1. ? Pain: to <7/10  for ease with work & ADLs. 2. ? Strength: R shoulder by 1/2 to a full grade throughout to improve posture & lifting ability for work & ADLs. .           3. Independent with Home Exercise Programs          4. ? Sleep Function: Pt to report 5 hours of uninterrupted sleep to improve quality of life & mental acuity at work. 5. ? AROM:  R shoulder Flex/ABD/ER/IR by 10-15 degrees to improve work & ADLs. 6. Demonstrate Knowledge of fall prevention                                LTG: (to be met in 18 treatments)         1.  ? Pain: to <2/10 for ease with work & ADLs.     2. ? Strength: R shoulder to 4+/5 throughout to improve posture & lifting ability for work & ADLs. 3.  ? Function: Decrease UEFS score to less than 15% deficit to demo improved R shoulder function at work. 4. ? AROM: R shoulder Flex/ABD/ER/IR to WNLs to improve work & ADLs. .   5. Pt will demo R  strength of 70lbs or more to improve gripping function with work & ADLs. 6.  Pt will report being able to do [place] ADLs with RUE with 90% ease and improvement of sxs with work & ADLs. Patient goals: \"Get R shoulder back the way it was & return to work & regular function\"    Pt. Education:  [x] Yes  [] No  [x] Reviewed Prior HEP/Ed  Method of Education: [x] Verbal more ex's each day  [x] Demo  [] Written   Access Code: [email or web address]  URL: [email or web address]  Date: 05/12/2022  Prepared by: Sarah Franklin    Program Notes  ktape - 4-5 days or longer if intact & not itchy - pressure/pull in increments to avoid skin tearing    Exercises  Seated Scapular Retraction - 1 x daily - 7 x weekly - 3 sets - 10 reps  Supine Shoulder Flexion Extension AAROM with Dowel - 1 x daily - 7 x weekly - 2 sets - 10 reps  Supine Shoulder External Rotation with Dowel - 1 x daily - 7 x weekly - 2 sets - 10 reps  Supine Shoulder Abduction AAROM with Dowel - 1 x daily - 7 x weekly - 2 sets - 10 reps  Supine Shoulder Press - 2 x daily - 2 sets - 10 reps  Seated Shoulder Flexion Towel Slide at Table Top - 1 x daily - 7 x weekly - 2 sets - 10 reps  Doorway Pec Stretch at 60 Elevation - 1 x daily - 7 x weekly - 2 sets - 10 reps  Shoulder Flexion Wall Slide with Towel - 1 x daily - 7 x weekly - 3 sets - 10 reps  Circular Shoulder Pendulum with Table Support - 2 x daily - 1 sets - 10 reps  Horizontal Shoulder Pendulum with Table Support - 1 x daily - 7 x weekly - 3 sets - 10 reps  Flexion-Extension Shoulder Pendulum with Table Support - 1 x daily - 7 x weekly - 3 sets - 10 reps  Scapular Retraction with Resistance - 1 x daily - 7 x weekly - 10 reps - 3 sets  Shoulder Extension with Resistance - 1 x daily - 7 x weekly - 10 reps - 3 sets  Shoulder Internal Rotation with Resistance - 1 x daily - 7 x weekly - 10 reps - 3 sets  Shoulder External Rotation with Anchored Resistance - 1 x daily - 7 x weekly - 10 reps - 3 sets  Shoulder External Rotation and Scapular Retraction with Resistance - 1 x daily - 7 x weekly - 10 reps - 3 sets  Patient Education  Shoulder Impingement  Rotator Cuff Tendinopathy  Sleep Positions  Ice  Heat  Office Posture  Lifting Techniques  Household Activities  Comprehension of Education:  [x] Verbalizes understanding. [x] Demonstrates understanding. [x] Needs review. FOR COMPLIANCE   [] Demonstrates/verbalizes HEP/Ed previously given. Plan: [x] Continue current frequency toward long and short term goals.     [x] Specific Instructions for subsequent treatments: see above      Time In: 9:30A           Time Out: 10:21am    Electronically signed by:  Bella Watts PTA

## 2022-06-20 ENCOUNTER — HOSPITAL ENCOUNTER (OUTPATIENT)
Dept: PHYSICAL THERAPY | Facility: CLINIC | Age: 47
Setting detail: THERAPIES SERIES
Discharge: HOME | End: 2022-06-20
Payer: COMMERCIAL

## 2022-06-20 PROCEDURE — 97110 THERAPEUTIC EXERCISES: CPT

## 2022-06-20 NOTE — FLOWSHEET NOTE
[] [facility]  [address]  [phone]  F: [phone] [x] [address]  [facility]   [address]  P: [phone]  F: [phone] [] [address]  [address]  P: [phone]  F: [phone] [] [address]  P: [phone]  F: [phone] [] [address]  [facility]   [address]   [place]: [phone]  F: [phone]      Physical Therapy Daily Treatment Note     Date:  2022  Patient Name:  Homero Mcgee   :  1975  MRN: [id number]   Physician: Debbie Pagan               Insurance: [facility]: [facility], 22 - 22  Medical Diagnosis: Strain of right shoulder, initial encounter                        Rehab Codes:M25.511, M25.611, M62.511, M62.81, R29.3, M79.1  Onset Date: 22                           Next 's appt:   Visit# / total visits:   Cancels/No Shows:     Subjective:    Pain:  [x] Yes  [] No Location: R shoulder Pain Rating: (0-10 scale) 4/10  Pain altered Tx:  [x] No  [] Yes  Action:  Comments: Pt enters reporting pain to be at 4/10. Pt notes that she had an appointment for dr. Jarek Jones this afternoon but missed it and had to reschedule as she showed up at the wrong clinic. No new changes reported.      Objective:  Modalities: MHP to R shoulder in seated at end of session x10'-not done 6/10  Not today Vaso: R shoulder vasocompression min compression 10min with pillow propped under   Not today D/C Kinesiotape: (2) long I strips to R UE         1st I: Anchored just superior to cubital fossa up anterior shoulder into R inferior angle of R scapula     2nd I: From anterolateral aspect of R shoulder into posterolateral shoulder  Precautions:PACEMAKER, Seizures  Exercises: [facility] Bolded completed 6/10  Exercise Reps/ Time Weight/ Level Comments   UBE 2' ea  Added 6/2; fwd/retro    Pulleys 2' ea   Scaption, flex- inc duration   Pendulums HEP   CW/CCW/Flex/Ext             Supine         Supine Wand AAROM 10x  2# wand  Flex. ABD.IR.ER.scaption. HAB. presses; progressed 5/26; increased wt 6/2   Scap retraction        PROM+ pos/inf mobs X   PROM in all directions in supine-guarded, empty end feels 6/10     serratus punch  10x2  A  added 5/16; added set 6/2   Rhythmic stabs 3x15\"  Added 6/2             Seated         R shoulder table slides    scaption    Scap retract 10x5\"             Side lying    Added 6/2   ER 15x     Abd  15x     HAB 15x           Prone    New 6/20   Pendulums  x     Rows  10x A    Ext  10x A    H abd  5x A Held after 5 reps due to inc pain          Standing         Pect stretch 3x15\"   45 deg   R UT stretch  3x15\"  Added 5/16   Posterior capsule stretch  3x15\"  Added 5/16   Wall push up w/ a plus 10x    added 'plus' 5/26   IR towel stretch  3x15\"    (not today)   Wall slides 10x3\"   Flex 6/2   Wall circles  10xea  Cw, ccw; added 5/16   Ball on wall  10x ea 1.1# Ball  A/P, M/L, cw, ccw; added weighted ball 5/26 (not today)   Resisted serratus slides  10x Lime  Added 5/26 (not today)   Bicep curls 3 way 10xea 2# Added 5/26             Tband     theraband added 5/12; progressed reps 5/26   Protraction 20 lime Added 6/10   Rows 15x lime      Ext 15x lime     ER 15x lime Towel roll   IR 15x lime Towel roll   Bicep curls  10x2 Blue   Added 5/16; increased resistance 6/2                      Trampoline throwing   1.1# tan ball  chest pass; added 5/16- attempted- painful                                Other: Pt guarded throughout ROM, with empty end feels.  Pt admits that she does not complete her abd/ER based HEP because they are so painful     Specific Instructions for next treatment: Measure PROM & AROM, HEP review, R shoulder RTC/Scapular stabilizers/Biceps strengthening, Posterior capsule stretching., R shoulder AAROM/AROM/PROM & flexibility, & MFR to pects/lats, postural awareness retraining     6/10/22  R Shldr AROM/PROM  All empty end feels  Seated flex: 136 AROM  Abduction: 92 *w/substitution 5/12/22  R shoulder PROM  Inc pain - empty EF   Supine AROM/PROM  Flex: 129/135  Abd: 75AAROM  IR: 62/68  ER: 60/65 ABD: 90 deg  Flexion: 90 deg  ER: 35 deg  IR: 40deg     Treatment Charges: Mins Units Time    []  Modalities MHP      [x]  Ther Exercise 44 3 6:04 pm- 6:48 pm   []  Manual Therapy      []  Ther Activities      []  Aquatics      []  Vasocompression      []  Other      Total Treatment time 44 3 6:04 pm- 6:48 pm    UBE 6:00- 6:04 pm not billed for     Assessment: [] Progressing toward goals. [] No change. [x] Other: Initiated session with UBE warm up followed by PROM. Pt continues to present with guarding and pain throughout PROM with empty end feel. Continued with previous exercises with prone progressions to further strengthen scapular musculature and increased duration for pulleys. Tactile and verbal cueing provided throughout for appropriate scapular coordination as pt tends to compensate with UT and minimal scapular retraction/depression. Limited tolerance to exercise d/t pain with all exercises and progressions this date- pt denies increase > 4/10 however does note the soreness usually occurs when home following therapy. Pt declines MHP and plans to use heat at home. [x] Pt would benefit from skilled PT services: to improve R shoulder AROM, stabilization & strengthening to improve overall pain-free function. STG: (to be met in 9 treatments)          1. ? Pain: to <7/10  for ease with work & ADLs. 2. ? Strength: R shoulder by 1/2 to a full grade throughout to improve posture & lifting ability for work & ADLs. .           3.  Independent with Home Exercise Programs          4. ? Sleep Function: Pt to report 5 hours of uninterrupted sleep to improve quality of life & mental acuity at work. 5. ? AROM:  R shoulder Flex/ABD/ER/IR by 10-15 degrees to improve work & ADLs. 6. Demonstrate Knowledge of fall prevention                                LTG: (to be met in 18 treatments)         1.  ? Pain: to <2/10 for ease with work & ADLs. 2. ? Strength: R shoulder to 4+/5 throughout to improve posture & lifting ability for work & ADLs. 3.  ? Function: Decrease UEFS score to less than 15% deficit to demo improved R shoulder function at work. 4. ? AROM: R shoulder Flex/ABD/ER/IR to WNLs to improve work & ADLs. .   5. Pt will demo R  strength of 70lbs or more to improve gripping function with work & ADLs. 6.  Pt will report being able to do [place] ADLs with RUE with 90% ease and improvement of sxs with work & ADLs. Patient goals: \"Get R shoulder back the way it was & return to work & regular function\"    Pt. Education:  [x] Yes  [] No  [x] Reviewed Prior HEP/Ed  Method of Education: [x] Verbal more ex's each day  [x] Demo  [] Written   Access Code: [email or web address]  URL: [email or web address]  Date: 05/12/2022  Prepared by: Hill Flank    Program Notes  ktape - 4-5 days or longer if intact & not itchy - pressure/pull in increments to avoid skin tearing    Exercises  Seated Scapular Retraction - 1 x daily - 7 x weekly - 3 sets - 10 reps  Supine Shoulder Flexion Extension AAROM with Dowel - 1 x daily - 7 x weekly - 2 sets - 10 reps  Supine Shoulder External Rotation with Dowel - 1 x daily - 7 x weekly - 2 sets - 10 reps  Supine Shoulder Abduction AAROM with Dowel - 1 x daily - 7 x weekly - 2 sets - 10 reps  Supine Shoulder Press - 2 x daily - 2 sets - 10 reps  Seated Shoulder Flexion Towel Slide at Table Top - 1 x daily - 7 x weekly - 2 sets - 10 reps  Doorway Pec Stretch at 60 Elevation - 1 x daily - 7 x weekly - 2 sets - 10 reps  Shoulder Flexion Wall Slide with Towel - 1 x daily - 7 x weekly - 3 sets - 10 reps  Circular Shoulder Pendulum with Table Support - 2 x daily - 1 sets - 10 reps  Horizontal Shoulder Pendulum with Table Support - 1 x daily - 7 x weekly - 3 sets - 10 reps  Flexion-Extension Shoulder Pendulum with Table Support - 1 x daily - 7 x weekly - 3 sets - 10 reps  Scapular Retraction with Resistance - 1 x daily - 7 x weekly - 10 reps - 3 sets  Shoulder Extension with Resistance - 1 x daily - 7 x weekly - 10 reps - 3 sets  Shoulder Internal Rotation with Resistance - 1 x daily - 7 x weekly - 10 reps - 3 sets  Shoulder External Rotation with Anchored Resistance - 1 x daily - 7 x weekly - 10 reps - 3 sets  Shoulder External Rotation and Scapular Retraction with Resistance - 1 x daily - 7 x weekly - 10 reps - 3 sets  Patient Education  Shoulder Impingement  Rotator Cuff Tendinopathy  Sleep Positions  Ice  Heat  Office Posture  Lifting Techniques  Household Activities  Comprehension of Education:  [x] Verbalizes understanding. [x] Demonstrates understanding. [x] Needs review. FOR COMPLIANCE   [] Demonstrates/verbalizes HEP/Ed previously given. Plan: [x] Continue current frequency toward long and short term goals.     [x] Specific Instructions for subsequent treatments: see above      Time In: 6:00 pm         Time Out: 6:48 pm     Electronically signed by:  Modesta Pinedo PT

## 2022-06-21 ENCOUNTER — HOSPITAL ENCOUNTER (OUTPATIENT)
Dept: PHYSICAL THERAPY | Facility: CLINIC | Age: 47
Setting detail: THERAPIES SERIES
Discharge: HOME | End: 2022-06-21
Payer: COMMERCIAL

## 2022-06-21 PROCEDURE — 97110 THERAPEUTIC EXERCISES: CPT

## 2022-06-21 NOTE — FLOWSHEET NOTE
[] [facility]  [address]  [phone]  F: [phone] [x] [address]  [facility]   [address]  P: [phone]  F: [phone] [] [address]  [address]  P: [phone]  F: [phone] [] [address]  P: [phone]  F: [phone] [] [address]  [facility]   [address]   [place]: [phone]  F: [phone]      Physical Therapy Daily Treatment Note     Date:  2022  Patient Name:  Duc Huber   :  1975  MRN: [id number]   Physician: Ginger Gr               Insurance: [facility]: [facility], 22 - 22  Medical Diagnosis: Strain of right shoulder, initial encounter                        Rehab Codes:M25.511, M25.611, M62.511, M62.81, R29.3, M79.1  Onset Date: 22                           Next 's appt:   Visit# / total visits:   Cancels/No Shows:     Subjective:    Pain:  [x] Yes  [] No Location: R shoulder- pain and soreness Pain Rating: (0-10 scale) 4-5/10  Pain altered Tx:  [x] No  [] Yes  Action: N/A  Comments: \"Worked it real hard yesterday [in last treatment session]. \" Plans to visit referring MD on Friday. Overall improvement with PT services thus far.      Objective:  Modalities: MHP to R shoulder in seated at end of session x10'-not done 6/10  Not today Vaso: R shoulder vasocompression min compression 10min with pillow propped under   Not today D/C Kinesiotape: (2) long I strips to R UE         1st I: Anchored just superior to cubital fossa up anterior shoulder into R inferior angle of R scapula     2nd I: From anterolateral aspect of R shoulder into posterolateral shoulder  Precautions:PACEMAKER, Seizures  Exercises: HEP REVIEW - Nataly Mei HEP access code Bolded completed 6/10  Exercise Reps/ Time Weight/ Level Comments   UBE x3' ea L3 Added 6/2; fwd/retro- B UEs    AAROM Pulleys x2' ea   Scaption, flex- frequent rest breaks d/t soreness    Pendulums HEP   CW/CCW/Flex/Ext             Supine         Supine Wand AAROM x10 ea 2# wand  Flex. ABD.IR.ER.scaption. HAB. presses; progressed 5/26; increased wt 6/2   Scap retraction        PROM+ pos/inf mobs X   PROM in all directions in supine-guarded, empty end feels 6/10    No mobs- 6/21/22   serratus punch  10x2  A  added 5/16; added set 6/2   Rhythmic stabs 3x15\"  Added 6/2             Seated         R shoulder table slides    scaption    Scap retract 10x5\"             Side lying    Added 6/2; to 2x10- 6/21/22   ER 2x10     Abd  2x10     HAB 2x10           Prone    New 6/20   Pendulums  x     Rows  2x10 [facility] reps/sets- 6/21/22   Ext  2x10 719 [facility] reps/sets- 6/21/22   H abd  5x A Held after 5 reps due to inc pain          Standing         Pect stretch 3x15\"   45 deg   R UT stretch  3x15\"  Added 5/16   Posterior capsule stretch  3x15\"  Added 5/16   Wall push up w/ a plus 10x    added 'plus' 5/26   IR towel stretch  3x15\"    (not today)   Wall slides 10x3\"   Flex 6/2   Wall circles  10xea  Cw, ccw; added 5/16   Ball on wall  10x ea 1.1# Ball  A/P, M/L, cw, ccw; added weighted ball 5/26 (not today)   Resisted serratus slides  10x Lime  Added 5/26 (not today)   Bicep curls 3 way 10xea 2# Added 5/26             Tband     theraband added 5/12; progressed reps 5/26   Protraction 20 lime Added 6/10   Rows 15x lime  To x20 without breaks- 6/21/22   Ext 15x lime To x20 without breaks- 6/21/22   ER 15x lime Towel roll; most challenge; to x20 without breaks- 6/21/22   IR 15x lime Towel roll; to x20 without breaks- 6/21/22   Bicep curls  10x2 Blue   Added 5/16; increased resistance 6/2                      Trampoline throwing   1.1# tan ball  chest pass; added 5/16- attempted- painful                                Other: BOLD is completed. Pt guarded throughout ROM, with empty end feels. Pt admits that she does not complete her abd/ER based HEP because they are so painful. Specific Instructions for next treatment: Measure PROM & AROM, HEP review, R shoulder RTC/Scapular stabilizers/Biceps strengthening, Posterior capsule stretching, R shoulder AAROM/AROM/PROM & flexibility, & MFR to pects/lats, postural awareness retraining. 6/10/22  R Shldr AROM/PROM  All empty end feels  Seated flex: 136 AROM  Abduction: 92 *w/substitution 5/12/22  R shoulder PROM  Inc pain - empty EF   Supine AROM/PROM  Flex: 129/135  Abd: 75AAROM  IR: 62/68  ER: 60/65 ABD: 90 deg  Flexion: 90 deg  ER: 35 deg  IR: 40deg     Treatment Charges: Mins Units Time    []  Modalities MHP      [x]  Ther Exercise 45 3 11:10-11:55AM   []  Manual Therapy      []  Ther Activities      []  Aquatics      []  Vasocompression      []  Other      Total Treatment time 45 3 11:10-11:55AM     [x6' on UBE] [11:04-11:10AM- not billed]     Assessment: [] Progressing toward goals. [] No change. [x] Other: Pt presents with MOD R shoulder pain and soreness, secondary to undergoing PT treatment yesterday. 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 Reports improvements overall with PT services thus far, and plans to visit referring MD on Friday. Therefore, proceeded with treatment with caution- did not implement new interventions or progressions as pt frequently reports soreness. However, able to inc reps/sets and dec rest breaks with postural/scapular strengthening interventions. Reports greatest pain and limitation with shoulder ER. Finished session with R shoulder PROM- did not implement mobs as no tissue tension or joint hypomobility detected. Pt limited secondary to reports of pain and muscle guarding- empty end-feel with all motions.  Will require goal update and date extension of BWC C9 next week if deemed appropriate by primary PT. [x] Pt would benefit from skilled PT services: to improve R shoulder AROM, stabilization & strengthening to improve overall pain-free function. STG: (to be met in 9 treatments)          1. ? Pain: to <7/10  for ease with work & ADLs. 2. ? Strength: R shoulder by 1/2 to a full grade throughout to improve posture & lifting ability for work & ADLs. .           3. Independent with Home Exercise Programs          4. ? Sleep Function: Pt to report 5 hours of uninterrupted sleep to improve quality of life & mental acuity at work. 5. ? AROM:  R shoulder Flex/ABD/ER/IR by 10-15 degrees to improve work & ADLs. 6. Demonstrate Knowledge of fall prevention                                LTG: (to be met in 18 treatments)         1.  ? Pain: to <2/10 for ease with work & ADLs. 2. ? Strength: R shoulder to 4+/5 throughout to improve posture & lifting ability for work & ADLs. 3.  ? Function: Decrease UEFS score to less than 15% deficit to demo improved R shoulder function at work. 4. ? AROM: R shoulder Flex/ABD/ER/IR to WNLs to improve work & ADLs. .   5. Pt will demo R  strength of 70lbs or more to improve gripping function with work & ADLs. 6.  Pt will report being able to do [place] ADLs with RUE with 90% ease and improvement of sxs with work & ADLs. Patient goals: \"Get R shoulder back the way it was & return to work & regular function\"    Pt. Education:  [x] Yes  [] No  [x] Reviewed Prior HEP/Ed  Method of Education: [] Verbal more ex's each day  [] Demo  [] Written   Access Code: [email or web address]  URL: [email or web address]  Date: 05/12/2022  Prepared by: Flaquito Contreras    Program Notes  ktape - 4-5 days or longer if intact & not itchy - pressure/pull in increments to avoid skin tearing    Exercises  Seated Scapular Retraction - 1 x daily - 7 x weekly - 3 sets - 10 reps  Supine Shoulder Flexion Extension AAROM with Dowel - 1 x daily - 7 x weekly - 2 sets - 10 reps  Supine Shoulder External Rotation with Dowel - 1 x daily - 7 x weekly - 2 sets - 10 reps  Supine Shoulder Abduction AAROM with Dowel - 1 x daily - 7 x weekly - 2 sets - 10 reps  Supine Shoulder Press - 2 x daily - 2 sets - 10 reps  Seated Shoulder Flexion Towel Slide at Table Top - 1 x daily - 7 x weekly - 2 sets - 10 reps  Doorway Pec Stretch at 60 Elevation - 1 x daily - 7 x weekly - 2 sets - 10 reps  Shoulder Flexion Wall Slide with Towel - 1 x daily - 7 x weekly - 3 sets - 10 reps  Circular Shoulder Pendulum with Table Support - 2 x daily - 1 sets - 10 reps  Horizontal Shoulder Pendulum with Table Support - 1 x daily - 7 x weekly - 3 sets - 10 reps  Flexion-Extension Shoulder Pendulum with Table Support - 1 x daily - 7 x weekly - 3 sets - 10 reps  Scapular Retraction with Resistance - 1 x daily - 7 x weekly - 10 reps - 3 sets  Shoulder Extension with Resistance - 1 x daily - 7 x weekly - 10 reps - 3 sets  Shoulder Internal Rotation with Resistance - 1 x daily - 7 x weekly - 10 reps - 3 sets  Shoulder External Rotation with Anchored Resistance - 1 x daily - 7 x weekly - 10 reps - 3 sets  Shoulder External Rotation and Scapular Retraction with Resistance - 1 x daily - 7 x weekly - 10 reps - 3 sets  Patient Education  Shoulder Impingement  Rotator Cuff Tendinopathy  Sleep Positions  Ice  Heat  Office Posture  Lifting Techniques  Household Activities  Comprehension of Education:  [] Verbalizes understanding. [] Demonstrates understanding. [] Needs review. FOR COMPLIANCE   [x] Demonstrates/verbalizes HEP/Ed previously given. Plan: [x] Continue current frequency toward long and short term goals. [x] Specific Instructions for subsequent treatments: Measure PROM & AROM, HEP review, R shoulder RTC/Scapular stabilizers/Biceps strengthening, Posterior capsule stretching, R shoulder AAROM/AROM/PROM & flexibility, & MFR to pects/lats, postural awareness retraining.       Time In: 11:04AM        Time Out: 11: 45CO    Electronically signed by:  Tom Soto, PT

## 2022-06-23 ENCOUNTER — HOSPITAL ENCOUNTER (OUTPATIENT)
Dept: PHYSICAL THERAPY | Facility: CLINIC | Age: 47
Setting detail: THERAPIES SERIES
Discharge: HOME | End: 2022-06-23
Payer: COMMERCIAL

## 2022-06-23 PROCEDURE — 97110 THERAPEUTIC EXERCISES: CPT

## 2022-06-23 NOTE — PROGRESS NOTES
[] [facility]) - [facility] &  Therapy  [address]  [phone]  F: [phone] [x] [address]  [facility]   [address]  P: [phone]  F: [phone] [] [address] &  Therapy  [address] [facility]  P: [phone]  F: [phone] [] [address]  P: [phone]  F: [phone] [] [address]  [facility]   [address]   [place]: [phone]  F: [phone]      Physical Therapy Progress Note    Date: 2022      Patient: Maik Thompson  : 1975  MRN: [id number]    Physician: Earle Gr               Insurance: [facility]: [facility], 22 - 22  Medical Diagnosis: Strain of right shoulder, initial encounter                        Rehab Codes:M25.511, M25.611, M62.511, M62.81, R29.3, M79.1  Onset Date: 22                           Next 's appt:   Visit# / total visits:                     Cancels/No Shows:     Reporting Period: 22-22        Subjective:  Pain:  [x]? Yes  []? No   Location: R shoulder               Pain Rating: (0-10 scale) 4/10  Pain altered Tx:  [x]? No  []? Yes  Action: N/A  Comments: Pt arrived to physical therapy with c/o R shoulder pain rated at 4/10. Pt reported R shoulder pain increased depending on wether pt is working that day or not. Pt reported has been compliant with HEP without any issue. Pt reported therapy has been helpful in reducing symptoms in R shoulder. Pt reported KT was great for pain however \"my skin don't like it\". Pt stated will see Doctor this 22 for f/u on R shoulder strain.             Assessment:  []? Progressing toward goals. []? No change. [x]?  Other: Limited completion of progressive therex program this date due to pt c/o increased pain and fatigue after re-assessment to check STGs and overall function. Pt demo improved pain level however cont to verbalize cont to have sleep disturbance as pt only able to sleep for about 2 hrs at night and worsen when sleeping on R side. Pt demo improved R shoulder strength as pt able to perform R shoulder mobility against gravity but cont to not be able to hold against manual resistance d/t increased pain. Pt demo progressing with R shoulder ROM as pt achieve an increased of at least 10° with all movements except R shoulder abduction AROM d/t increased of pain at end-range. Spent time educated pt on cont with HEP in a consistent manner and benefits of ice application for pain and inflammation management. Will consider re-apply KT next session per pt tolerance and progress as able to assist pt in returning to prior level of function. [x]? Pt would benefit from skilled PT services: to improve R shoulder AROM, stabilization & strengthening to improve overall pain-free function.            STG: (to be met in 9 treatments) - Re-assessed by An Kimmy Olivarez PT on 6/23/22           1. ? Pain: to <7/10  for ease with work & ADLs. - MET           2. ? Strength: R shoulder by 1/2 to a full grade throughout to improve posture & lifting ability for work & ADLs - MET           3. Independent with Home Exercise Programs - MET          5. ? Sleep Function: Pt to report 5 hours of uninterrupted sleep to improve quality of life & mental acuity at work. - Not Met (pt reported cont to only able to sleep for 2 hours especially when sleeping on R side)           5. ? AROM:  R shoulder Flex/ABD/ER/IR by 10-15 degrees to improve work & ADLs. - Progressing (abd 86°)                                  LTG: (to be met in 18 treatments) - Ongoing - will re-assess at visit 18         1.  ? Pain: to <2/10 for ease with work & ADLs.     2. ? Strength: R shoulder to 4+/5 throughout to improve posture & lifting ability for work & ADLs.     3.  ? Function: Decrease UEFS score to less than 15% deficit to demo improved R shoulder function at work.    4. ? AROM: R shoulder Flex/ABD/ER/IR to WNLs to improve work & ADLs.  .   5. Pt will demo R  strength of 70lbs or more to improve gripping function with work & ADLs.  6.  Pt will report being able to do [place] ADLs with RUE with 90% ease and improvement of sxs with work & ADLs.          Patient goals: \"Get R shoulder back the way it was & return to work & regular function\"        Treatment Plan:  [x]??????? Therapeutic Exercise   15434             []??????? Iontophoresis: 4 mg/mL Dexamethasone Sodium Phosphate  mAmin  81256   [x]??????? Therapeutic Activity  55124 [x]??????? Vasopneumatic cold with compression  84953               []??????? Gait Training    69437 []??????? Ultrasound        62683   [x]??????? Neuromuscular Re-education  28308 []??????? Electrical Stimulation Unattended  76394   [x]??????? Manual Therapy  33296 []??????? Electrical Stimulation Attended  77724   [x]??????? Instruction in HEP       []??????? Lumbar/Cervical Traction  26724   []??????? Aquatic Therapy           62070 [x]??????? Cold/hotpack     []??????? Massage   67480      []??????? Dry Needling, 1 or 2 muscles  59485   []??????? Biofeedback, first 15 minutes   74922  []??????? Biofeedback, additional 15 minutes   66024 []??????? Dry Needling, 3 or more muscles  15764            Patient Status:     [x] Continue per initial plan of care. [] Additional visits necessary. [] Other:             Electronically signed by An Salas Kennedy PT on 6/23/2022 at 3:58 PM      If you have any questions or concerns, please don't hesitate to call.   Thank you for your referral.    Physician Signature:________________________________Date:__________________  By signing above or cosigning this note, I have reviewed this plan of care and certify a need for medically necessary rehabilitation services.      *PLEASE SIGN ABOVE AND FAX BACK ALL PAGES*

## 2022-06-23 NOTE — FLOWSHEET NOTE
[] [facility] [address] [facility]  [address]  [phone]  F: [phone] [x] [address]  [facility]   [address]  P: [phone]  F: [phone] [] [address]  [address] [facility] [address]  P: [phone]  F: [phone] [] [address]  P: [phone]  F: [phone] [] [address]  [facility]   [address]   [place]: [phone]  F: [phone]      Physical Therapy Daily Treatment Note     Date:  2022  Patient Name:  Kimberly Proctor   :  1975  MRN: [id number]   Physician: Silvino Fierro               Insurance: [facility]: 18, 22 - 22  Medical Diagnosis: Strain of right shoulder, initial encounter                        Rehab Codes:M25.511, M25.611, M62.511, M62.81, R29.3, M79.1  Onset Date: 22                           Next 's appt:   Visit# / total visits:   Cancels/No Shows:     Subjective:    Pain:  [x] Yes  [] No Location: R shoulder  Pain Rating: (0-10 scale) 4/10  Pain altered Tx:  [x] No  [] Yes  Action: N/A  Comments: Pt arrived to physical therapy with c/o R shoulder pain rated at 4/10. Pt reported R shoulder pain increased depending on wether pt is working that day or not. Pt reported has been compliant with HEP without any issue. Pt reported therapy has been helpful in reducing symptoms in R shoulder. Pt reported KT was great for pain however \"my skin don't like it\". Pt stated will see Doctor this 22 for f/u on R shoulder strain.             Objective:  Modalities: MHP to R shoulder in seated at end of session x10'-not done 6/10  Not today Vaso: R shoulder vasocompression min compression 10min with pillow propped under   Not today D/C Kinesiotape: (2) long I strips to R UE         1st I: Anchored just superior to cubital fossa up anterior shoulder into R inferior angle of R scapula     2nd I: From anterolateral aspect of R shoulder into posterolateral shoulder  Precautions:PACEMAKER, Seizures  Exercises: HEP REVIEW - [email or web address] [employer] HEP access code Bolded completed 6/23/2022   Exercise Reps/ Time Weight/ Level Comments   UBE x3' ea L3 Added 6/2; fwd/retro- B UEs    AAROM Pulleys x2' ea   Scaption, flex- frequent rest breaks d/t soreness    Pendulums HEP   CW/CCW/Flex/Ext             Supine         Wand AAROM x10 ea 3# wand  Flex. ABD.IR.ER.scaption. HAB. presses; progressed 5/26; increased wt 6/2   Scap retraction        PROM+ pos/inf mobs X   PROM in all directions in supine-guarded, empty end feels 6/10    No mobs- 6/21/22   serratus punch 10x2  1#DB  added 5/16; added set 6/2; added 1#DB 2nd set   SA cw/ccw  10x  1#DB Added 6/23   Rhythmic stabs 3x15\"  Added 6/2             Seated         R shoulder table slides    scaption    Scap retract 10x5\"             Side lying    Added 6/2; to 2x10- 6/21/22   ER 2x10 1#DB    Abd  2x10 1#DB    HAB 2x10           Prone    New 6/20   Pendulums  x     Rows  2x10 7[facility] reps/sets- 6/21/22   Ext  2x10 719 [facility] reps/sets- 6/21/22   H abd  5x A Held after 5 reps due to inc pain          Standing         Pect stretch 3x15\"   45 deg   R UT stretch  3x15\"  Added 5/16   Posterior capsule stretch  3x15\"  Added 5/16   Wall push up w/ a plus 10x    added 'plus' 5/26   IR towel stretch  3x15\"    (not today)   Wall slides 10x3\"   Flex 6/2   Wall circles  10xea  Cw, ccw; added 5/16   Ball on wall  10x ea 1.1# Ball  A/P, M/L, cw, ccw; added weighted ball 5/26 (not today)   Resisted serratus slides  10x Lime  Added 5/26 (not today)   Bicep curls 3 way 10xea 2# Added 5/26             Tband     theraband added 5/12; progressed reps 5/26   Protraction 20 lime Added 6/10   Rows 15x lime  To x20 without breaks- 6/21/22   Ext 15x lime To x20 without breaks- 6/21/22   ER 15x lime Towel roll; most challenge; to x20 without breaks- 6/21/22   IR 15x lime Towel roll; to x20 without breaks- 6/21/22   Bicep curls  10x2 Blue   Added 5/16; increased resistance 6/2                      Trampoline throwing   1.1# tan ball  chest pass; added 5/16- attempted- painful                                Other: BOLD is completed. Pt guarded throughout ROM, with empty end feels. Pt admits that she does not complete her abd/ER based HEP because they are so painful. Specific Instructions for next treatment: Measure PROM & AROM, HEP review, R shoulder RTC/Scapular stabilizers/Biceps strengthening, Posterior capsule stretching, R shoulder AAROM/AROM/PROM & flexibility, & MFR to pects/lats, postural awareness retraining. 6/10/22  R Shldr AROM/PROM  All empty end feels  Seated flex: 136 AROM  Abduction: 92 *w/substitution 5/12/22  R shoulder PROM  Inc pain - empty EF   Supine AROM/PROM  Flex: 129/135  Abd: 75AAROM  IR: 62/68  ER: 60/65 ABD: 90 deg  Flexion: 90 deg  ER: 35 deg  IR: 40deg           Treatment Charges: Mins Units Time    []  Modalities MHP      [x]  Ther Exercise 37 2 3:10pm-3:47pm   []  Manual Therapy      []  Ther Activities      []  Aquatics      []  Vasocompression      []  Other      Total Treatment time 37 2 3:10pm-3:47pm   Re-assessment time is billed under therex this date 6/23/22           Objective: Re-assessed by An Alex Jacome PT on 6/23/22   *=pain    AROM°   LUE WNLs STRENGTH     LUE WNLs     Left Right Left Right   Shld Flex   90*  112*   3/5*   Shld Abd   90*  86*   3-/5*    Shld    [email or web address]   35*/40*  46*/59*   3-*/3-*   Ext   10*   3+*                                            70# 60#                         Assessment: [] Progressing toward goals. [] No change. [x] Other: Limited completion of progressive therex program this date due to pt c/o increased pain and fatigue after re-assessment to check STGs and overall function.   Pt demo improved pain level however cont to verbalize cont to have sleep disturbance as pt only able to sleep for about 2 hrs at night and worsen when sleeping on R side. Pt demo improved R shoulder strength as pt able to perform R shoulder mobility against gravity but cont to not be able to hold against manual resistance d/t increased pain. Pt demo progressing with R shoulder ROM as pt achieve an increased of at least 10° with all movements except R shoulder abduction AROM d/t increased of pain at end-range. Spent time educated pt on cont with HEP in a consistent manner and benefits of ice application for pain and inflammation management. Will consider re-apply KT next session per pt tolerance and progress as able to assist pt in returning to prior level of function. [x] Pt would benefit from skilled PT services: to improve R shoulder AROM, stabilization & strengthening to improve overall pain-free function. STG: (to be met in 9 treatments) - Re-assessed by An [place], PT on 6/23/22           1. ? Pain: to <7/10  for ease with work & ADLs. - MET           2. ? Strength: R shoulder by 1/2 to a full grade throughout to improve posture & lifting ability for work & ADLs - MET           3. Independent with Home Exercise Programs - MET          4. ? Sleep Function: Pt to report 5 hours of uninterrupted sleep to improve quality of life & mental acuity at work. - Not Met (pt reported cont to only able to sleep for 2 hours especially when sleeping on R side)           5. ? AROM:  R shoulder Flex/ABD/ER/IR by 10-15 degrees to improve work & ADLs. - Progressing (abd 86°)                                  LTG: (to be met in 18 treatments) - Ongoing - will re-assess at visit 18         1.  ? Pain: to <2/10 for ease with work & ADLs. 2. ? Strength: R shoulder to 4+/5 throughout to improve posture & lifting ability for work & ADLs.     3.  ? Function: Decrease UEFS score to less than 15% deficit to demo improved R shoulder function at work. 4. ? AROM: R shoulder Flex/ABD/ER/IR to WNLs to improve work & ADLs. .   5. Pt will demo R  strength of 70lbs or more to improve gripping function with work & ADLs. 6.  Pt will report being able to do [place] ADLs with RUE with 90% ease and improvement of sxs with work & ADLs. Patient goals: \"Get R shoulder back the way it was & return to work & regular function\"           Pt. Education:  [x] Yes  [] No  [x] Reviewed Prior HEP/Ed  Method of Education: [] Verbal more ex's each day  [] Demo  [] Written   Access Code: [email or web address]  URL: [email or web address]  Date: 05/12/2022  Prepared by: Tiff Pi    Program Notes  ktape - 4-5 days or longer if intact & not itchy - pressure/pull in increments to avoid skin tearing    Exercises  Seated Scapular Retraction - 1 x daily - 7 x weekly - 3 sets - 10 reps  Supine Shoulder Flexion Extension AAROM with Dowel - 1 x daily - 7 x weekly - 2 sets - 10 reps  Supine Shoulder External Rotation with Dowel - 1 x daily - 7 x weekly - 2 sets - 10 reps  Supine Shoulder Abduction AAROM with Dowel - 1 x daily - 7 x weekly - 2 sets - 10 reps  Supine Shoulder Press - 2 x daily - 2 sets - 10 reps  Seated Shoulder Flexion Towel Slide at Table Top - 1 x daily - 7 x weekly - 2 sets - 10 reps  Doorway Pec Stretch at 60 Elevation - 1 x daily - 7 x weekly - 2 sets - 10 reps  Shoulder Flexion Wall Slide with Towel - 1 x daily - 7 x weekly - 3 sets - 10 reps  Circular Shoulder Pendulum with Table Support - 2 x daily - 1 sets - 10 reps  Horizontal Shoulder Pendulum with Table Support - 1 x daily - 7 x weekly - 3 sets - 10 reps  Flexion-Extension Shoulder Pendulum with Table Support - 1 x daily - 7 x weekly - 3 sets - 10 reps  Scapular Retraction with Resistance - 1 x daily - 7 x weekly - 10 reps - 3 sets  Shoulder Extension with Resistance - 1 x daily - 7 x weekly - 10 reps - 3 sets  Shoulder Internal Rotation with Resistance - 1 x daily - 7 x weekly - 10 reps - 3 sets  Shoulder External Rotation with Anchored Resistance - 1 x daily - 7 x weekly - 10 reps - 3 sets  Shoulder External Rotation and Scapular Retraction with Resistance - 1 x daily - 7 x weekly - 10 reps - 3 sets  Patient Education  Shoulder Impingement  Rotator Cuff Tendinopathy  Sleep Positions  Ice  Heat  Office Posture  Lifting Techniques  Household Activities      Comprehension of Education:  [] Verbalizes understanding. [] Demonstrates understanding. [] Needs review. FOR COMPLIANCE   [x] Demonstrates/verbalizes HEP/Ed previously given. Plan: [x] Continue current frequency toward long and short term goals. [x] Specific Instructions for subsequent treatments: Measure PROM & AROM, HEP review, R shoulder RTC/Scapular stabilizers/Biceps strengthening, Posterior capsule stretching, R shoulder AAROM/AROM/PROM & flexibility, & MFR to pects/lats, postural awareness retraining. Time In: 3:10pm          Time Out: 3:47pm    Electronically signed by:   Ariella Sanders, PT

## 2022-06-23 NOTE — FLOWSHEET NOTE
[] [facility]) - [facility] &  Therapy  [address]  [phone]  F: [phone] [x] [address] [facility] [address]   [address]  P: [phone]  F: [phone] [] [address] &  Therapy  [address] [facility]  P: [phone]  F: [phone] [] [address]  [facility]   [address]   [place]: [phone]  F: [phone]     THERAPY RESPONSIBILITY OF CARE TRANSFER FORM       PATIENT NAME: Delmar Almodovar  MRN: [id number]   : 1975      TRANSFERRING FACILITY:    [] [facility]   [] [facility]   [x]  [facility]   [] [facility]   [] [facility]   [] [address]   [] [facility]  [] [facility]   [] Other:       ACCEPTING FACILITY   [] [facility]   [] [facility]   [x]  [facility]   [] [facility]   [] [facility]   [] [address]   [] [facility] [address] [facility]  [] [facility]   [] Other:          REASON FOR TRANSFER: new accepting PT with better availability for patient        TRANSFER OF CARE:    I am transferring the care of the above patient to: Ariella Hernandez, PT  Janae Whitehead, REFUGIO  2022      ACCEPTANCE OF CARE:     I am accepting the care of the above patient.  Ariella Hernandez, PT

## 2022-06-30 ENCOUNTER — HOSPITAL ENCOUNTER (OUTPATIENT)
Dept: PHYSICAL THERAPY | Facility: CLINIC | Age: 47
Setting detail: THERAPIES SERIES
Discharge: HOME | End: 2022-06-30
Payer: COMMERCIAL

## 2022-06-30 PROCEDURE — 97110 THERAPEUTIC EXERCISES: CPT

## 2022-06-30 NOTE — FLOWSHEET NOTE
[] [facility]  [address]  [phone]  F: [phone] [x] [address]  [facility]   [address]  P: [phone]  F: [phone] [] [address]  [address]  P: [phone]  F: [phone] [] [address]  P: [phone]  F: [phone] [] [address]  [facility]   [address]   [place]: [phone]  F: [phone]      Physical Therapy Daily Treatment Note     Date:  2022  Patient Name:  Lorena Marcus   :  1975  MRN: [id number]   Physician: Rolly Gr               Insurance: [facility]: [facility], 22 - 22  Medical Diagnosis: Strain of right shoulder, initial encounter                        Rehab Codes:M25.511, M25.611, M62.511, M62.81, R29.3, M79.1  Onset Date: 22                           Next 's appt: 22 (rescheduled)   Visit# / total visits: 10/18    Cancels/No Shows: 12    Subjective:    Pain:  [x] Yes  [] No Location: R shoulder  Pain Rating: (0-10 scale) 2-3/10  Pain altered Tx:  [x] No  [] Yes  Action: N/A  Comments: Pt arrived to physical therapy with c/o pain \"not too bad\" in R shoulder rated 2-3/10. Pt reported f/u with Dr. Aida Jaimes got reschedule due to \"they have some emergency they need to reschedule with me\". Pt reported therapy has been helpful in improving R shoulder ROM which pt was not able to lift arm up before. Pt reported has been compliant with HEP and has been completing it 3-4x/week when not coming to therapy.           Objective:  Modalities: MHP to R shoulder in seated at end of session x10'-not done 6/10  Not today Vaso: R shoulder vasocompression min compression 10min with pillow propped under   Not today D/C Kinesiotape: (2) long I strips to R UE         1st I: Anchored just superior to cubital fossa up anterior shoulder into R inferior angle of R scapula     2nd I: From anterolateral aspect of R shoulder into posterolateral shoulder  Precautions:PACEMAKER, Seizures  Exercises: [facility] - [facility] Bolded completed 6/30/2022   Exercise Reps/ Time Weight/ Level Comments   UBE x3' ea L3 Added 6/2; fwd/retro- B UEs    AAROM Pulleys x2' ea   Scaption, flex- frequent rest breaks d/t soreness    Pendulums HEP   CW/CCW/Flex/Ext             Supine         Wand AAROM 15x5s 3# wand  6/30-added hold at end-range   Flex/Abd/IR/ER/Scap/HAb/chestpress   Scap retraction        PROM+ pos/inf mobs X   PROM in all directions in supine-guarded, empty end feels 6/10    No mobs- 6/21/22   serratus punch 10x2 1#DB  added 5/16; added set 6/2; added 1#DB 2nd set   SA cw/ccw  10x  1#DB Added 6/23   Rhythmic stabs 3x15\"  Added 6/2             Seated         R shoulder table slides    scaption    Scap retract 10x5\"             Side lying    Added 6/2; to 2x10- 6/21/22   ER 2x10 1#DB    Abd  2x10 1#DB    HAB 2x10           Prone    New 6/20   Pendulums  x     Rows  2x10 719 [facility] reps/sets- 6/21/22   Ext  2x10 719 [facility] reps/sets- 6/21/22   H abd  5x A Held after 5 reps due to inc pain          Standing         Pec stretch 2x30\"   6/30- inc hold duration; 45 deg, 90 deg    R UT stretch  3x15\"  Added 5/16   Posterior capsule stretch  3x15\"  Added 5/16   Wall push up w/ a plus 10x    added 'plus' 5/26   IR towel stretch  3x15\"    (not today)   Wall slides 10x3\"   Flex 6/2   Wall circles  10xea  Cw, ccw; added 5/16   Ball on wall (abd) 10x ea 1.1# Ball  A/P, M/L, cw, ccw; added weighted ball 5/26 6/30-abduction direction   Resisted serratus slides  10x Lime  Added 5/26 (not today)   Bicep curls 3 way 10xea 2# Added 5/26   Wall plank 3x20  Added 6/30             Tband     theraband added 5/12; progressed reps 5/26   Protraction 20 lime Added 6/10   Rows 20x lime  To x20 without breaks- 6/21/22   Ext 20x lime To x20 without breaks- 6/21/22   ER 20x  lime Towel roll, improved 6/30   IR 20x lime Towel roll; to x20 without breaks- 6/21/22   Bicep curls  10x2 Blue   Added 5/16; increased resistance 6/2                      Trampoline throwing   1.1# tan ball  chest pass; added 5/16- attempted- painful    Ball toss  3x30s  basketball Added 6/30-bilateral overhand                        Other: BOLD is completed. Pt guarded throughout ROM, with empty end feels. Pt admits that she does not complete her abd/ER based HEP because they are so painful. Specific Instructions for next treatment: Measure PROM & AROM, HEP review, R shoulder RTC/Scapular stabilizers/Biceps strengthening, Posterior capsule stretching, R shoulder AAROM/AROM/PROM & flexibility, & MFR to pects/lats, postural awareness retraining. 6/10/22  R Shldr AROM/PROM  All empty end feels  Seated flex: 136 AROM  Abduction: 92 *w/substitution 5/12/22  R shoulder PROM  Inc pain - empty EF   Supine AROM/PROM  Flex: 129/135  Abd: 75AAROM  IR: 62/68  ER: 60/65 ABD: 90 deg  Flexion: 90 deg  ER: 35 deg  IR: 40deg           Treatment Charges: Mins Units Time    []  Modalities MHP      [x]  Ther Exercise 40 3 2:07pm-2:53pm   []  Manual Therapy      []  Ther Activities      []  Aquatics      []  Vasocompression      []  Other      Total Treatment time 40 3 2:07pm-2:53pm           Objective: Re-assessed by An Mathew Jacome, PT on 6/23/22   *=pain    AROM°   LUE WNLs STRENGTH     LUE WNLs     Left Right Left Right   Shld Flex   90*  112*   3/5*   Shld Abd   90*  86*   3-/5*    Shld    Gonzalo@[email or web address]   35*/40*  46*/59*   3-*/3-*   Ext   10*   3+*                                            70# 60#                         Assessment: [x] Progressing toward goals.   Pt was able to tolerate progressive therex program this date despite c/o mild increased in R shoulder pain at end-range AA horizontal abduction with weighted wand with tactile cues provided to fully extend R elbow to maximize R shoulder horizontal abduction mobility. Short rest break provided with weighted exs on mat and in standing due to increased muscle fatigue and soreness. Cues provided with ball on wall RUE at 90° abduction for proper technique and form to reduce elbow flexion compensation. Pt cont to demo muscle weakness and instability of RUE with added wall plank as RUE tend to slipped off of wall. Pt demo improved resisted R ER with TB compared to last visit, stated \"I've been doing this more at home\". Added bilat overhand ball toss to challenge dynamic stability of scapulothoracic stabilizers with pt c/o increased R shoulder pain with catching. Therapist frequently provided education on benefits of ice application on pain and inflammation management throughout today's session. Pt stated \"I'll give it a try\". Pt denied CP/HP at end of session and stated will try ice or apply heat at home. [] No change. [x] Other:  [x] Pt would benefit from skilled PT services: to improve R shoulder AROM, stabilization & strengthening to improve overall pain-free function. STG: (to be met in 9 treatments) - Re-assessed by An Neida Ramírez PT on 6/23/22           1. ? Pain: to <7/10  for ease with work & ADLs. - MET           2. ? Strength: R shoulder by 1/2 to a full grade throughout to improve posture & lifting ability for work & ADLs - MET           3. Independent with Home Exercise Programs - MET          4. ? Sleep Function: Pt to report 5 hours of uninterrupted sleep to improve quality of life & mental acuity at work. - Not Met (pt reported cont to only able to sleep for 2 hours especially when sleeping on R side)           5. ? AROM:  R shoulder Flex/ABD/ER/IR by 10-15 degrees to improve work & ADLs.  - Progressing (abd 86°)                                  LTG: (to be met in 18 treatments) - Ongoing - will re-assess at visit 18         1.  ? Pain: to <2/10 for ease with work & ADLs. 2. ? Strength: R shoulder to 4+/5 throughout to improve posture & lifting ability for work & ADLs. 3.  ? Function: Decrease UEFS score to less than 15% deficit to demo improved R shoulder function at work. 4. ? AROM: R shoulder Flex/ABD/ER/IR to WNLs to improve work & ADLs. .   5. Pt will demo R  strength of 70lbs or more to improve gripping function with work & ADLs. 6.  Pt will report being able to do [place] ADLs with RUE with 90% ease and improvement of sxs with work & ADLs. Patient goals: \"Get R shoulder back the way it was & return to work & regular function\"           Pt. Education:  [x] Yes  [] No  [x] Reviewed Prior HEP/Ed  Method of Education: [] Verbal more ex's each day  [] Demo  [] Written   Access Code: [email or web address]  URL: [email or web address]  Date: 05/12/2022  Prepared by: Devin Mary    Program Notes  ktape - 4-5 days or longer if intact & not itchy - pressure/pull in increments to avoid skin tearing    Exercises  Seated Scapular Retraction - 1 x daily - 7 x weekly - 3 sets - 10 reps  Supine Shoulder Flexion Extension AAROM with Dowel - 1 x daily - 7 x weekly - 2 sets - 10 reps  Supine Shoulder External Rotation with Dowel - 1 x daily - 7 x weekly - 2 sets - 10 reps  Supine Shoulder Abduction AAROM with Dowel - 1 x daily - 7 x weekly - 2 sets - 10 reps  Supine Shoulder Press - 2 x daily - 2 sets - 10 reps  Seated Shoulder Flexion Towel Slide at Table Top - 1 x daily - 7 x weekly - 2 sets - 10 reps  Doorway Pec Stretch at 60 Elevation - 1 x daily - 7 x weekly - 2 sets - 10 reps  Shoulder Flexion Wall Slide with Towel - 1 x daily - 7 x weekly - 3 sets - 10 reps  Circular Shoulder Pendulum with Table Support - 2 x daily - 1 sets - 10 reps  Horizontal Shoulder Pendulum with Table Support - 1 x daily - 7 x weekly - 3 sets - 10 reps  Flexion-Extension Shoulder Pendulum with Table Support - 1 x daily - 7 x weekly - 3 sets - 10 reps  Scapular Retraction with Resistance - 1 x daily - 7 x weekly - 10 reps - 3 sets  Shoulder Extension with Resistance - 1 x daily - 7 x weekly - 10 reps - 3 sets  Shoulder Internal Rotation with Resistance - 1 x daily - 7 x weekly - 10 reps - 3 sets  Shoulder External Rotation with Anchored Resistance - 1 x daily - 7 x weekly - 10 reps - 3 sets  Shoulder External Rotation and Scapular Retraction with Resistance - 1 x daily - 7 x weekly - 10 reps - 3 sets  Patient Education  Shoulder Impingement  Rotator Cuff Tendinopathy  Sleep Positions  Ice  Heat  Office Posture  Lifting Techniques  Household Activities      Comprehension of Education:  [] Verbalizes understanding. [] Demonstrates understanding. [] Needs review. FOR COMPLIANCE   [x] Demonstrates/verbalizes HEP/Ed previously given. Plan: [x] Continue current frequency toward long and short term goals. [x] Specific Instructions for subsequent treatments: Measure PROM & AROM, HEP review, R shoulder RTC/Scapular stabilizers/Biceps strengthening, Posterior capsule stretching, R shoulder AAROM/AROM/PROM & flexibility, & MFR to pects/lats, postural awareness retraining. Time In: 2:07 pm          Time Out: 2:54 pm    Electronically signed by:   Ariella Elliott, PT

## 2022-07-28 ENCOUNTER — HOSPITAL ENCOUNTER (OUTPATIENT)
Dept: PHYSICAL THERAPY | Facility: CLINIC | Age: 47
Setting detail: THERAPIES SERIES
Discharge: HOME | End: 2022-07-28
Payer: COMMERCIAL

## 2022-07-28 PROCEDURE — 97110 THERAPEUTIC EXERCISES: CPT

## 2022-07-28 NOTE — FLOWSHEET NOTE
[] [facility].  [address]  [phone]  F: [phone] [x] [address]  [facility] 36   [address]  P: [phone]  F: [phone] [] [address]  [address]  P: [phone]  F: [phone] [] [address]  P: [phone]  F: [phone] [] [address]  [facility]   [address]   [place]: [phone]  F: [phone]      Physical Therapy Daily Treatment Note     Date:  2022  Patient Name:  Lucy Guerrero   :  1975  MRN: [id number]   Physician: Kishore Millard               Insurance: [facility]: 18, 22 - 22 as of 22 8 vs remaining [facility] 22 - 22  Medical Diagnosis: Strain of right shoulder, initial encounter                        Rehab Codes:M25.511, M25.611, M62.511, M62.81, R29.3, M79.1  Onset Date: 22                           Next 's appt: 22 (rescheduled)   Visit# / total visits:     Cancels/No Shows: 1/2    Subjective:    Pain:  [x] Yes  [] No Location: R shoulder  Pain Rating: (0-10 scale) 3/10  Pain altered Tx:  [x] No  [] Yes  Action: N/A  Comments: HA currently, states she gets them daily still.  States she has been working on [employer] since she hasn't been here in a month ( covid )           Objective:  Modalities: MHP to R shoulder in seated at end of session x10'-not done 6/10  Not today Vaso: R shoulder vasocompression min compression 10min with pillow propped under   Not today D/C Kinesiotape: (2) long I strips to R UE         1st I: Anchored just superior to cubital fossa up anterior shoulder into R inferior angle of R scapula     2nd I: From anterolateral aspect of R shoulder into posterolateral shoulder  Precautions:PACEMAKER, Seizures  Exercises: HEP REVIEW - [email or web address] [facility] HEP access code Bolded completed 7/28/2022   Exercise Reps/ Time Weight/ Level Comments   UBE x3' ea L3 Added 6/2; fwd/retro- B UEs    AAROM Pulleys x2' ea   Scaption, flex- frequent rest breaks d/t soreness    Pendulums HEP   CW/CCW/Flex/Ext             Supine         Wand AAROM 15x5s 3# wand  6/30-added hold at end-range   Flex/Abd/IR/ER/Scap/HAb/chestpress   Scap retraction        PROM+ pos/inf mobs X   PROM in all directions in supine-guarded, empty end feels 6/10    No mobs- 6/21/22   serratus punch 10x2 1#DB  added 5/16; added set 6/2; added 1#DB 2nd set   SA cw/ccw  10x  1#DB Added 6/23   Rhythmic stabs 3x15\"  Added 6/2             Seated         R shoulder table slides    scaption    Scap retract 10x5\"             Side lying    Added 6/2; to 2x10- 6/21/22   ER 2x10 1#DB    Abd  2x10 1#DB    HAB 2x10           Prone    New 6/20   Pendulums  x     Rows  2x10 [facility] reps/sets- 6/21/22   Ext  2x10 A Inc reps/sets- 6/21/22   H abd  5x A Held after 5 reps due to inc pain          Standing         Pec stretch 2x30\"   6/30- inc hold duration; 45 deg, 90 deg    R UT stretch  3x15\"  Added 5/16   Posterior capsule stretch  3x15\"  Added 5/16   Wall push up w/ a plus 10x    added 'plus' 5/26   IR towel stretch  3x15\"    (not today)   Wall slides 10x3\"   Flex 6/2   Wall circles  10xea  Cw, ccw; added 5/16   Ball on wall (abd) 10x ea 1.1# Ball  A/P, M/L, cw, ccw; added weighted ball 5/26 6/30-abduction direction   Resisted serratus slides  10x Lime  Added 5/26    Bicep curls 3 way 20xea 2# Added 5/26   Wall plank 3x20  Added 6/30   Wall push ups  2x10   New 7/28   Tband     theraband added 5/12; progressed reps 5/26   Protraction 20 lime Added 6/10   Rows 20x lime  To x20 without breaks- 6/21/22   Ext 20x lime To x20 without breaks- 6/21/22   ER 20x  lime Towel roll, improved 6/30   IR 20x lime Towel roll; to x20 without breaks- 6/21/22   Bicep curls  10x2 Blue   Added 5/16; increased resistance 6/2                      Trampoline throwing   1.1# tan ball  chest pass; added 5/16- attempted- painful    Ball toss  3x30s  basketball Added 6/30-bilateral overhand                        Other: BOLD is completed. Pt guarded throughout ROM, with empty end feels. Pt admits that she does not complete her abd/ER based HEP because they are so painful. Specific Instructions for next treatment: Measure PROM & AROM, HEP review, R shoulder RTC/Scapular stabilizers/Biceps strengthening, Posterior capsule stretching, R shoulder AAROM/AROM/PROM & flexibility, & MFR to pects/lats, postural awareness retraining. 6/10/22  R Shldr AROM/PROM  All empty end feels  Seated flex: 136 AROM  Abduction: 92 *w/substitution 5/12/22  R shoulder PROM  Inc pain - empty EF   Supine AROM/PROM  Flex: 129/135  Abd: 75AAROM  IR: 62/68  ER: 60/65 ABD: 90 deg  Flexion: 90 deg  ER: 35 deg  IR: 40deg           Treatment Charges: Mins Units Time    []  Modalities MHP      [x]  Ther Exercise 42 3 2:07pm-2:55 pm   []  Manual Therapy      []  Ther Activities      []  Aquatics      []  Vasocompression      []  Other      Total Treatment time 42 3 2:07pm-2:55 pm     UBE warm up not billed for 2:00 pm - 2:06 pm       Objective: Re-assessed by An Choco Hopper, PT on 6/23/22   *=pain    AROM°   LUE WNLs STRENGTH     LUE WNLs     Left Right Left Right   Shld Flex   90*  112*   3/5*   Shld Abd   90*  86*   3-/5*    Shld    ER/IR @ 90   35*/40*  46*/59*   3-*/3-*   Ext   10*   3+*                                            70# 60#                         Assessment: [x] Progressing toward goals. Pt with overall good tolerance to treatment this date. Pt was able to resume some previously held interventions with out incident. Intermittent cueing to improve form of exercises with overall moderate carryover present. Added in wall push ups to improve WB into UE. Pt denies an increase in pain post session. [] No change. [x] Other:  [x] Pt would benefit from skilled PT services: to improve R shoulder AROM, stabilization & strengthening to improve overall pain-free function. STG: (to be met in 9 treatments) - Re-assessed by An Choco Hopper PT on 6/23/22           1. ? Pain: to <7/10  for ease with work & ADLs. - MET           2. ? Strength: R shoulder by 1/2 to a full grade throughout to improve posture & lifting ability for work & ADLs - MET           3. Independent with Home Exercise Programs - MET          4. ? Sleep Function: Pt to report 5 hours of uninterrupted sleep to improve quality of life & mental acuity at work. - Not Met (pt reported cont to only able to sleep for 2 hours especially when sleeping on R side)           5. ? AROM:  R shoulder Flex/ABD/ER/IR by 10-15 degrees to improve work & ADLs. - Progressing (abd 86°)                                  LTG: (to be met in 18 treatments) - Ongoing - will re-assess at visit 18         1.  ? Pain: to <2/10 for ease with work & ADLs. 2. ? Strength: R shoulder to 4+/5 throughout to improve posture & lifting ability for work & ADLs. 3.  ? Function: Decrease UEFS score to less than 15% deficit to demo improved R shoulder function at work. 4. ? AROM: R shoulder Flex/ABD/ER/IR to WNLs to improve work & ADLs. .   5. Pt will demo R  strength of 70lbs or more to improve gripping function with work & ADLs. 6.  Pt will report being able to do [place] ADLs with RUE with 90% ease and improvement of sxs with work & ADLs. Patient goals: \"Get R shoulder back the way it was & return to work & regular function\"           Pt. Education:  [x] Yes  [] No  [x] Reviewed Prior HEP/Ed  Method of Education: [] Verbal more ex's each day  [] Demo  [] Written   Access Code: [email or web address]  URL: [email or web address]  Date: 05/12/2022  Prepared by: Katarzyna Pa    Program Notes  ktape - 4-5 days or longer if intact & not itchy - pressure/pull in increments to avoid skin tearing    Exercises  Seated Scapular Retraction - 1 x daily - 7 x weekly - 3 sets - 10 reps  Supine Shoulder Flexion Extension AAROM with Dowel - 1 x daily - 7 x weekly - 2 sets - 10 reps  Supine Shoulder External Rotation with Dowel - 1 x daily - 7 x weekly - 2 sets - 10 reps  Supine Shoulder Abduction AAROM with Dowel - 1 x daily - 7 x weekly - 2 sets - 10 reps  Supine Shoulder Press - 2 x daily - 2 sets - 10 reps  Seated Shoulder Flexion Towel Slide at Table Top - 1 x daily - 7 x weekly - 2 sets - 10 reps  Doorway Pec Stretch at 60 Elevation - 1 x daily - 7 x weekly - 2 sets - 10 reps  Shoulder Flexion Wall Slide with Towel - 1 x daily - 7 x weekly - 3 sets - 10 reps  Circular Shoulder Pendulum with Table Support - 2 x daily - 1 sets - 10 reps  Horizontal Shoulder Pendulum with Table Support - 1 x daily - 7 x weekly - 3 sets - 10 reps  Flexion-Extension Shoulder Pendulum with Table Support - 1 x daily - 7 x weekly - 3 sets - 10 reps  Scapular Retraction with Resistance - 1 x daily - 7 x weekly - 10 reps - 3 sets  Shoulder Extension with Resistance - 1 x daily - 7 x weekly - 10 reps - 3 sets  Shoulder Internal Rotation with Resistance - 1 x daily - 7 x weekly - 10 reps - 3 sets  Shoulder External Rotation with Anchored Resistance - 1 x daily - 7 x weekly - 10 reps - 3 sets  Shoulder External Rotation and Scapular Retraction with Resistance - 1 x daily - 7 x weekly - 10 reps - 3 sets  Patient Education  Shoulder Impingement  Rotator Cuff Tendinopathy  Sleep Positions  Ice  Heat  Office Posture  Lifting Techniques  Household Activities      Comprehension of Education:  [] Verbalizes understanding. [] Demonstrates understanding. [] Needs review. FOR COMPLIANCE   [x] Demonstrates/verbalizes HEP/Ed previously given. Plan: [x] Continue current frequency toward long and short term goals.     [x] Specific Instructions for subsequent treatments: Measure PROM & AROM, HEP review, R shoulder RTC/Scapular stabilizers/Biceps strengthening, Posterior capsule stretching, R shoulder AAROM/AROM/PROM & flexibility, & MFR to pects/lats, postural awareness retraining.       Time In: 2:00 pm          Time Out: 2:55 pm    Electronically signed by:  Hali Martin PTA

## 2022-08-03 ENCOUNTER — HOSPITAL ENCOUNTER (OUTPATIENT)
Dept: PHYSICAL THERAPY | Facility: CLINIC | Age: 47
Setting detail: THERAPIES SERIES
Discharge: HOME | End: 2022-08-03
Payer: COMMERCIAL

## 2022-08-03 ENCOUNTER — OFFICE VISIT (OUTPATIENT)
Dept: ORTHOPEDIC SURGERY | Age: 47
End: 2022-08-03
Payer: COMMERCIAL

## 2022-08-03 VITALS — BODY MASS INDEX: 32.99 KG/M2 | HEIGHT: 65 IN | RESPIRATION RATE: 14 BRPM | WEIGHT: 198 LBS

## 2022-08-03 DIAGNOSIS — S46.911A STRAIN OF RIGHT SHOULDER, INITIAL ENCOUNTER: Primary | ICD-10-CM

## 2022-08-03 PROCEDURE — 99212 OFFICE O/P EST SF 10 MIN: CPT | Performed by: ORTHOPAEDIC SURGERY

## 2022-08-03 PROCEDURE — 97110 THERAPEUTIC EXERCISES: CPT

## 2022-08-03 NOTE — LETTER
[facility]  [address] [address] [place] [address]  Phone: [phone]  Fax: [phone]    Barry Hamilton MD        August 3, 2022     Patient: Birdie Boykin   YOB: 1975   Date of Visit: 8/3/2022       To Whom It May Concern: It is my medical opinion that [facility] may return to full duty immediately with no restrictions. If you have any questions or concerns, please don't hesitate to call.     Sincerely,        Barry Hamilton MD

## 2022-08-03 NOTE — FLOWSHEET NOTE
[] [facility].  [address]  [phone]  F: [phone] [x] [address]  [facility] 36   [address]  P: [phone]  F: [phone] [] [address]  [address]  P: [phone]  F: [phone] [] [address]  P: [phone]  F: [phone] [] [address]  [facility]   [address]   [place]: [phone]  F: [phone]      Physical Therapy Daily Treatment Note     Date:  8/3/2022  Patient Name:  Elissa Mitchell   :  1975  MRN: [id number]   Physician: Chandler Gr               Insurance: [facility]: 18, 22 - 22 as of 22 8 vs remaining [facility] 22 - 22  Medical Diagnosis: Strain of right shoulder, initial encounter                        Rehab Codes:M25.511, M25.611, M62.511, M62.81, R29.3, M79.1  Onset Date: 22                           Next 's appt: 22 (rescheduled)   Visit# / total visits:     Cancels/No Shows: /    Subjective:    Pain:  [x] Yes  [] No Location: low back (currently), R shoulder [past]  Pain Rating: (0-10 scale) 8/10  Pain altered Tx:  [x] No  [] Yes  Action: N/A  Comments: Pt arrived 12 mins late to physical therapy, noted \"I come straight from work\" but able to accommodate. Pt denied pain in R shoulder however c/o increased pain in low back rated 8/10, noted \"I think I pull something\". Pt noted compliant with HEP and has been helpful with R shoulder symptoms.             Objective:  Modalities: MHP to R shoulder in seated at end of session x10'-not done 6/10  Not today Vaso: R shoulder vasocompression min compression 10min with pillow propped under   Not today D/C Kinesiotape: (2) long I strips to R UE         1st I: Anchored just superior to cubital fossa up anterior shoulder into R inferior angle of R scapula     2nd I: From anterolateral aspect of R shoulder into posterolateral shoulder  Precautions:PACEMAKER, Seizures  Exercises: HEP REVIEW - [address] [place] [address] [employer] HEP access code Bolded completed 8/3/2022   Exercise Reps/ Time Weight/ Level Comments   UBE x3' ea L3 Added 6/2; fwd/retro- B UEs    AAROM Pulleys x2' ea   Scaption, flex- frequent rest breaks d/t soreness    Pendulums HEP   CW/CCW/Flex/Ext             Supine         Wand AAROM 20x  3# wand  6/30-added hold at end-range   Flex/Abd/IR/ER/Scap/HAb/chestpress   Scap retraction        PROM+ pos/inf mobs X   PROM in all directions in supine-guarded, empty end feels 6/10    No mobs- 6/21/22   serratus punch 10x2 2#DB Inc wt 8/3   SA cw/ccw  10x2 2#DB Inc wt & sets 8/3   Rhythmic stabs 3x15\"  Added 6/2             Seated         R shoulder table slides    scaption    Scap retract 10x5\"             Side lying    Added 6/2; to 2x10- 6/21/22   ER 2x10 2#DB    Abd  2x10 2#DB    HAB 2x10           Prone    New 6/20   Pendulums  x     Rows  2x10 A Inc reps/sets- 6/21/22   Ext  2x10 1# Complete bilat 8/3   H abd  5x A Held after 5 reps due to inc pain    W/T/Y 2x10 ea 1# New 8/3-completed bilat         Standing         Pec stretch 2x30\"   6/30- inc hold duration; 45 deg, 90 deg; inc hold time 8/3   R UT stretch  3x15\"  Added 5/16   Posterior capsule stretch  1'  Inc hold time 8/3   Wall push up w/ a plus 2x10    added 'plus' 5/26   IR towel stretch  3x15\"    (not today)   Wall slides 10x3\"   Flex 6/2   Wall circles  10xea  Cw, ccw; added 5/16   Ball on wall (abd) 10x ea 3.3# Ball  A/P, M/L, cw, ccw; added weighted ball 5/26 6/30-abduction direction; inc wt & reps 8/3   Resisted serratus slides  15x Lime  Added 5/26    Bicep curls 3 way 20xea 2# Added 5/26   Wall plank 3x20  Added 6/30   Wall push ups  2x10   New 7/28               Tband     theraband added 5/12; progressed reps 5/26 Protraction 20 lime Added 6/10   Rows 20x lime  To x20 without breaks- 6/21/22   Ext 20x lime To x20 without breaks- 6/21/22   ER 20x  lime Towel roll, improved 6/30   IR 20x lime Towel roll; to x20 without breaks- 6/21/22   Bicep curls  10x2 Blue   Added 5/16; increased resistance 6/2                      Trampoline throwing 2x30s 2.2# ball  chest pass; added 5/16- attempted- painful    Ball toss  3x30s  basketball Added 6/30-bilateral overhand                        Other: BOLD is completed. Pt guarded throughout ROM, with empty end feels. Pt admits that she does not complete her abd/ER based HEP because they are so painful. Specific Instructions for next treatment: Measure PROM & AROM, HEP review, R shoulder RTC/Scapular stabilizers/Biceps strengthening, Posterior capsule stretching, R shoulder AAROM/AROM/PROM & flexibility, & MFR to pects/lats, postural awareness retraining. 6/10/22  R Shldr AROM/PROM  All empty end feels  Seated flex: 136 AROM  Abduction: 92 *w/substitution 5/12/22  R shoulder PROM  Inc pain - empty EF   Supine AROM/PROM  Flex: 129/135  Abd: 75AAROM  IR: 62/68  ER: 60/65 ABD: 90 deg  Flexion: 90 deg  ER: 35 deg  IR: 40deg           Treatment Charges: Mins Units Time    []  Modalities MHP      [x]  Ther Exercise 39 3 2:15pm-2:54pm   []  Manual Therapy      []  Ther Activities      []  Aquatics      []  Vasocompression      []  Other      Total Treatment time 39 3 2:15pm-2:54pm           Objective: Re-assessed by An Benji Pryor PT on 6/23/22   *=pain    AROM°   LUE WNLs STRENGTH     LUE WNLs     Left Right Left Right   Shld Flex   90*  112*   3/5*   Shld Abd   90*  86*   3-/5*    Shld    ER/IR @ 90   35*/40*  46*/59*   3-*/3-*   Ext   10*   3+*                                            70# 60#                         Assessment: [x] Progressing toward goals.   Pt was able to tolerate progressive therex program with c/o increased muscle soreness d/t pt having to work this morning however no c/o increased sharp pain in R shoulder noted. Progressed SA punches/cw/ccw, sidelying ER/abd, ball on wall, resumed push up plus, added prone W/T/Y and push up plus to cont improving RUE and scapulothoracic stabilizers strength. Good tolerance with trampoline ball toss at chest press and overhead ball toss with basketball with c/o \"a little tight\". End session with pec stretch and post capsule stretch with inc hold time to improve muscular tension and tightness. Pt was instructed to ice at home to reduce pain and inflammation post-exs. Pt verbalized understanding. [] No change. [x] Other:  [x] Pt would benefit from skilled PT services: to improve R shoulder AROM, stabilization & strengthening to improve overall pain-free function. STG: (to be met in 9 treatments) - Re-assessed by An Aj Banda PT on 6/23/22           1. ? Pain: to <7/10  for ease with work & ADLs. - MET           2. ? Strength: R shoulder by 1/2 to a full grade throughout to improve posture & lifting ability for work & ADLs - MET           3. Independent with Home Exercise Programs - MET          4. ? Sleep Function: Pt to report 5 hours of uninterrupted sleep to improve quality of life & mental acuity at work. - Not Met (pt reported cont to only able to sleep for 2 hours especially when sleeping on R side)           5. ? AROM:  R shoulder Flex/ABD/ER/IR by 10-15 degrees to improve work & ADLs. - Progressing (abd 86°)                                  LTG: (to be met in 18 treatments) - Ongoing - will re-assess at visit 18         1.  ? Pain: to <2/10 for ease with work & ADLs. 2. ? Strength: R shoulder to 4+/5 throughout to improve posture & lifting ability for work & ADLs. 3.  ? Function: Decrease UEFS score to less than 15% deficit to demo improved R shoulder function at work. 4. ? AROM: R shoulder Flex/ABD/ER/IR to WNLs to improve work & ADLs. .   5.  Pt will demo R  strength of 70lbs or more to improve gripping function with work & ADLs. 6.  Pt will report being able to do [place] ADLs with RUE with 90% ease and improvement of sxs with work & ADLs. Patient goals: \"Get R shoulder back the way it was & return to work & regular function\"           Pt. Education:  [x] Yes  [] No  [x] Reviewed Prior HEP/Ed  Method of Education: [] Verbal more ex's each day  [] Demo  [] Written   Access Code: [email or web address]  URL: [email or web address]  Date: 05/12/2022  Prepared by: Nirmala Becerra    Program Notes  ktape - 4-5 days or longer if intact & not itchy - pressure/pull in increments to avoid skin tearing    Exercises  Seated Scapular Retraction - 1 x daily - 7 x weekly - 3 sets - 10 reps  Supine Shoulder Flexion Extension AAROM with Dowel - 1 x daily - 7 x weekly - 2 sets - 10 reps  Supine Shoulder External Rotation with Dowel - 1 x daily - 7 x weekly - 2 sets - 10 reps  Supine Shoulder Abduction AAROM with Dowel - 1 x daily - 7 x weekly - 2 sets - 10 reps  Supine Shoulder Press - 2 x daily - 2 sets - 10 reps  Seated Shoulder Flexion Towel Slide at Table Top - 1 x daily - 7 x weekly - 2 sets - 10 reps  Doorway Pec Stretch at 60 Elevation - 1 x daily - 7 x weekly - 2 sets - 10 reps  Shoulder Flexion Wall Slide with Towel - 1 x daily - 7 x weekly - 3 sets - 10 reps  Circular Shoulder Pendulum with Table Support - 2 x daily - 1 sets - 10 reps  Horizontal Shoulder Pendulum with Table Support - 1 x daily - 7 x weekly - 3 sets - 10 reps  Flexion-Extension Shoulder Pendulum with Table Support - 1 x daily - 7 x weekly - 3 sets - 10 reps  Scapular Retraction with Resistance - 1 x daily - 7 x weekly - 10 reps - 3 sets  Shoulder Extension with Resistance - 1 x daily - 7 x weekly - 10 reps - 3 sets  Shoulder Internal Rotation with Resistance - 1 x daily - 7 x weekly - 10 reps - 3 sets  Shoulder External Rotation with Anchored Resistance - 1 x daily - 7 x weekly - 10 reps - 3 sets  Shoulder External Rotation and Scapular Retraction with Resistance - 1 x daily - 7 x weekly - 10 reps - 3 sets  Patient Education  Shoulder Impingement  Rotator Cuff Tendinopathy  Sleep Positions  Ice  Heat  Office Posture  Lifting Techniques  Household Activities      Comprehension of Education:  [] Verbalizes understanding. [] Demonstrates understanding. [] Needs review. FOR COMPLIANCE   [x] Demonstrates/verbalizes HEP/Ed previously given. Plan: [x] Continue current frequency toward long and short term goals. [x] Specific Instructions for subsequent treatments: Measure PROM & AROM, HEP review, R shoulder RTC/Scapular stabilizers/Biceps strengthening, Posterior capsule stretching, R shoulder AAROM/AROM/PROM & flexibility, & MFR to pects/lats, postural awareness retraining. Time In: 2:12 pm          Time Out: 2:54 pm    Electronically signed by:   Ariella Sood PT

## 2022-08-05 NOTE — PROGRESS NOTES
HPI: Ms. Consuelo Miller is a 14-year-old approximately 5-1/2 months out from a right shoulder strain. She has been managed conservatively with prescription NSAIDs, a cortisone injection which worked well for her for about 6 to 8 weeks and ongoing physical therapy. She states that she is doing better but still has pain in her shoulder which she rates as a 4/10 intermittently. She remains on light duty at work at this time. Physical examination:  Evaluation patient's right shoulder and upper extremity demonstrates intact skin without warmth, erythema, or notable swelling. Actively she has 120 degrees of shoulder elevation, abduction, 75 degrees of external rotation internal rotation L4. Passively she has 145 degrees of shoulder elevation, 140 degrees of abduction and 80 degrees of external rotation. She is tender to palpation over the anterolateral corner of the shoulder. She has 5/5 muscle strength with resisted deltoid, supraspinatus, external rotation and internal rotation testing. Negative Speed's test and negative Neer's and Villareal impingement signs. Impression and plan: Ms. Consuelo Miller is a 14-year-old 5 and half months out from right shoulder strain. She has been managed extensively conservatively as outlined above. She has made improvement but still has some residual pain in her shoulder. At this time she was encouraged to continue with physical therapy and once discharged continue on with her home exercise program.  She would like to try another cortisone injection to her shoulder due to believe is reasonable. A C9 will be completed to obtain approval from [employer]. She can return to work at this time without restriction. I will see her back in my clinic for the cortisone injection if approved otherwise as needed.

## 2022-09-13 ENCOUNTER — TELEPHONE (OUTPATIENT)
Dept: ORTHOPEDIC SURGERY | Age: 47
End: 2022-09-13

## 2022-09-13 NOTE — TELEPHONE ENCOUNTER
LVM with pt notifying her that we have received approval for her right shoulder injection from [facility]. She was advised to call the office back to schedule that appt.

## 2022-09-26 ENCOUNTER — OFFICE VISIT (OUTPATIENT)
Dept: ORTHOPEDIC SURGERY | Age: 47
End: 2022-09-26
Payer: COMMERCIAL

## 2022-09-26 VITALS — HEIGHT: 65 IN | RESPIRATION RATE: 16 BRPM | WEIGHT: 198 LBS | BODY MASS INDEX: 32.99 KG/M2

## 2022-09-26 DIAGNOSIS — S46.911A STRAIN OF RIGHT SHOULDER, INITIAL ENCOUNTER: Primary | ICD-10-CM

## 2022-09-26 PROCEDURE — 99999 PR OFFICE/OUTPT VISIT,PROCEDURE ONLY: CPT | Performed by: ORTHOPAEDIC SURGERY

## 2022-09-26 PROCEDURE — 20610 DRAIN/INJ JOINT/BURSA W/O US: CPT | Performed by: ORTHOPAEDIC SURGERY

## 2022-09-26 RX ORDER — LIDOCAINE HYDROCHLORIDE 10 MG/ML
3 INJECTION, SOLUTION INFILTRATION; PERINEURAL ONCE
Status: COMPLETED | OUTPATIENT
Start: 2022-09-26 | End: 2022-09-26

## 2022-09-26 RX ORDER — TRIAMCINOLONE ACETONIDE 40 MG/ML
40 INJECTION, SUSPENSION INTRA-ARTICULAR; INTRAMUSCULAR ONCE
Status: COMPLETED | OUTPATIENT
Start: 2022-09-26 | End: 2022-09-26

## 2022-09-26 RX ADMIN — LIDOCAINE HYDROCHLORIDE 3 ML: 10 INJECTION, SOLUTION INFILTRATION; PERINEURAL at 13:55

## 2022-09-26 RX ADMIN — TRIAMCINOLONE ACETONIDE 40 MG: 40 INJECTION, SUSPENSION INTRA-ARTICULAR; INTRAMUSCULAR at 13:56

## 2022-09-26 NOTE — PROGRESS NOTES
HPI: Ms. Don Garcia presents today for cortisone junction to the right shoulder as previously discussed. She did get approval through [employer]. The injection was administered as outlined below. I will see her back in my clinic as needed but she may return or call at anytime with persistent or worsening symptoms or with any questions or concerns. Procedure: right shoulder subacromial space injection  Following an appropriate discussion with the patient regarding the risks and benefits of the procedure she consented to proceed. her right shoulder was prepped using chlorhexadine solution. Using aseptic technique and through a posterior approach, her right shoulder subacromial space was injected with a 4 cc mixture of 1cc 40mg/ml kenalog and 3 cc of 1% lidocaine without epinephrine. A band aid was applied to the injection site. she tolerated the injection with no immediate adverse reactions.

## 2023-08-20 VITALS
HEART RATE: 88 BPM | DIASTOLIC BLOOD PRESSURE: 88 MMHG | TEMPERATURE: 98.2 F | RESPIRATION RATE: 18 BRPM | OXYGEN SATURATION: 98 % | BODY MASS INDEX: 31.95 KG/M2 | SYSTOLIC BLOOD PRESSURE: 154 MMHG | WEIGHT: 192 LBS

## 2023-08-20 PROCEDURE — 99285 EMERGENCY DEPT VISIT HI MDM: CPT

## 2023-08-20 PROCEDURE — 96375 TX/PRO/DX INJ NEW DRUG ADDON: CPT

## 2023-08-20 PROCEDURE — 96361 HYDRATE IV INFUSION ADD-ON: CPT

## 2023-08-20 PROCEDURE — 96374 THER/PROPH/DIAG INJ IV PUSH: CPT

## 2023-08-20 ASSESSMENT — PAIN - FUNCTIONAL ASSESSMENT: PAIN_FUNCTIONAL_ASSESSMENT: 0-10

## 2023-08-20 ASSESSMENT — PAIN SCALES - GENERAL: PAINLEVEL_OUTOF10: 8

## 2023-08-21 ENCOUNTER — APPOINTMENT (OUTPATIENT)
Dept: CT IMAGING | Age: 48
End: 2023-08-21
Payer: COMMERCIAL

## 2023-08-21 ENCOUNTER — HOSPITAL ENCOUNTER (EMERGENCY)
Age: 48
Discharge: HOME | End: 2023-08-21
Attending: STUDENT IN AN ORGANIZED HEALTH CARE EDUCATION/TRAINING PROGRAM
Payer: COMMERCIAL

## 2023-08-21 DIAGNOSIS — K52.9 GASTROENTERITIS: Primary | ICD-10-CM

## 2023-08-21 LAB
ALBUMIN SERPL-MCNC: 4.3 G/DL (ref 3.5–5.2)
ALP SERPL-CCNC: 74 U/L (ref 35–104)
ALT SERPL-CCNC: 28 U/L (ref 5–33)
ANION GAP SERPL CALCULATED.3IONS-SCNC: 11 MMOL/L (ref 9–17)
AST SERPL-CCNC: 32 U/L
BACTERIA URNS QL MICRO: ABNORMAL
BASOPHILS # BLD: <0.03 K/UL (ref 0–0.2)
BASOPHILS NFR BLD: 0 % (ref 0–2)
BILIRUB SERPL-MCNC: 0.4 MG/DL (ref 0.3–1.2)
BILIRUB UR QL STRIP: ABNORMAL
BUN SERPL-MCNC: 10 MG/DL (ref 6–20)
BUN/CREAT SERPL: 11 (ref 9–20)
CALCIUM SERPL-MCNC: 9.4 MG/DL (ref 8.6–10.4)
CHLORIDE SERPL-SCNC: 103 MMOL/L (ref 98–107)
CLARITY UR: CLEAR
CO2 SERPL-SCNC: 26 MMOL/L (ref 20–31)
COLOR UR: YELLOW
CREAT SERPL-MCNC: 0.9 MG/DL (ref 0.5–0.9)
EOSINOPHIL # BLD: 0.15 K/UL (ref 0–0.44)
EOSINOPHILS RELATIVE PERCENT: 3 % (ref 1–4)
EPI CELLS #/AREA URNS HPF: ABNORMAL /HPF (ref 0–5)
ERYTHROCYTE [DISTWIDTH] IN BLOOD BY AUTOMATED COUNT: 13.9 % (ref 11.8–14.4)
GFR SERPL CREATININE-BSD FRML MDRD: >60 ML/MIN/1.73M2
GLUCOSE SERPL-MCNC: 95 MG/DL (ref 70–99)
GLUCOSE UR STRIP-MCNC: NEGATIVE MG/DL
HCT VFR BLD AUTO: 38 % (ref 36.3–47.1)
HGB BLD-MCNC: 12.1 G/DL (ref 11.9–15.1)
HGB UR QL STRIP.AUTO: NEGATIVE
IMM GRANULOCYTES # BLD AUTO: 0.02 K/UL (ref 0–0.3)
IMM GRANULOCYTES NFR BLD: 0 %
KETONES UR STRIP-MCNC: NEGATIVE MG/DL
LEUKOCYTE ESTERASE UR QL STRIP: NEGATIVE
LIPASE SERPL-CCNC: 19 U/L (ref 13–60)
LYMPHOCYTES NFR BLD: 1.59 K/UL (ref 1.1–3.7)
LYMPHOCYTES RELATIVE PERCENT: 32 % (ref 24–43)
MCH RBC QN AUTO: 29 PG (ref 25.2–33.5)
MCHC RBC AUTO-ENTMCNC: 31.8 G/DL (ref 28.4–34.8)
MCV RBC AUTO: 91.1 FL (ref 82.6–102.9)
MONOCYTES NFR BLD: 0.47 K/UL (ref 0.1–1.2)
MONOCYTES NFR BLD: 9 % (ref 3–12)
NEUTROPHILS NFR BLD: 56 % (ref 36–65)
NEUTS SEG NFR BLD: 2.78 K/UL (ref 1.5–8.1)
NITRITE UR QL STRIP: NEGATIVE
NRBC BLD-RTO: 0 PER 100 WBC
PH UR STRIP: 6 [PH] (ref 5–8)
PLATELET # BLD AUTO: 247 K/UL (ref 138–453)
PMV BLD AUTO: 10 FL (ref 8.1–13.5)
POTASSIUM SERPL-SCNC: 3.8 MMOL/L (ref 3.7–5.3)
PROT SERPL-MCNC: 8.3 G/DL (ref 6.4–8.3)
PROT UR STRIP-MCNC: ABNORMAL MG/DL
RBC # BLD AUTO: 4.17 M/UL (ref 3.95–5.11)
RBC #/AREA URNS HPF: ABNORMAL /HPF (ref 0–2)
SODIUM SERPL-SCNC: 140 MMOL/L (ref 135–144)
SP GR UR STRIP: 1.04 (ref 1–1.03)
TROPONIN I SERPL HS-MCNC: 9 NG/L (ref 0–14)
TROPONIN I SERPL HS-MCNC: 9 NG/L (ref 0–14)
UROBILINOGEN UR STRIP-ACNC: NORMAL EU/DL (ref 0–1)
WBC #/AREA URNS HPF: ABNORMAL /HPF (ref 0–5)
WBC OTHER # BLD: 5 K/UL (ref 3.5–11.3)

## 2023-08-21 PROCEDURE — 80053 COMPREHEN METABOLIC PANEL: CPT

## 2023-08-21 PROCEDURE — 74177 CT ABD & PELVIS W/CONTRAST: CPT

## 2023-08-21 PROCEDURE — 2580000003 HC RX 258: Performed by: STUDENT IN AN ORGANIZED HEALTH CARE EDUCATION/TRAINING PROGRAM

## 2023-08-21 PROCEDURE — 6360000004 HC RX CONTRAST MEDICATION: Performed by: STUDENT IN AN ORGANIZED HEALTH CARE EDUCATION/TRAINING PROGRAM

## 2023-08-21 PROCEDURE — 83690 ASSAY OF LIPASE: CPT

## 2023-08-21 PROCEDURE — 85025 COMPLETE CBC W/AUTO DIFF WBC: CPT

## 2023-08-21 PROCEDURE — 81001 URINALYSIS AUTO W/SCOPE: CPT

## 2023-08-21 PROCEDURE — 84484 ASSAY OF TROPONIN QUANT: CPT

## 2023-08-21 PROCEDURE — 6360000002 HC RX W HCPCS: Performed by: STUDENT IN AN ORGANIZED HEALTH CARE EDUCATION/TRAINING PROGRAM

## 2023-08-21 RX ORDER — 0.9 % SODIUM CHLORIDE 0.9 %
1000 INTRAVENOUS SOLUTION INTRAVENOUS ONCE
Status: COMPLETED | OUTPATIENT
Start: 2023-08-21 | End: 2023-08-21

## 2023-08-21 RX ORDER — ONDANSETRON HYDROCHLORIDE 4 MG/5ML
4 SOLUTION ORAL ONCE
Status: DISCONTINUED | OUTPATIENT
Start: 2023-08-21 | End: 2023-08-21

## 2023-08-21 RX ORDER — DICYCLOMINE HYDROCHLORIDE 10 MG/1
10 CAPSULE ORAL 4 TIMES DAILY
Qty: 120 CAPSULE | Refills: 0 | Status: SHIPPED | OUTPATIENT
Start: 2023-08-21

## 2023-08-21 RX ORDER — BUTALBITAL, ACETAMINOPHEN AND CAFFEINE 50; 325; 40 MG/1; MG/1; MG/1
1 TABLET ORAL EVERY 4 HOURS PRN
Qty: 30 TABLET | Refills: 0 | Status: SHIPPED | OUTPATIENT
Start: 2023-08-21

## 2023-08-21 RX ORDER — ONDANSETRON 2 MG/ML
4 INJECTION INTRAMUSCULAR; INTRAVENOUS ONCE
Status: COMPLETED | OUTPATIENT
Start: 2023-08-21 | End: 2023-08-21

## 2023-08-21 RX ORDER — METOCLOPRAMIDE HYDROCHLORIDE 5 MG/ML
10 INJECTION INTRAMUSCULAR; INTRAVENOUS ONCE
Status: COMPLETED | OUTPATIENT
Start: 2023-08-21 | End: 2023-08-21

## 2023-08-21 RX ORDER — 0.9 % SODIUM CHLORIDE 0.9 %
80 INTRAVENOUS SOLUTION INTRAVENOUS ONCE
Status: COMPLETED | OUTPATIENT
Start: 2023-08-21 | End: 2023-08-21

## 2023-08-21 RX ORDER — SODIUM CHLORIDE 0.9 % (FLUSH) 0.9 %
10 SYRINGE (ML) INJECTION PRN
Status: DISCONTINUED | OUTPATIENT
Start: 2023-08-21 | End: 2023-08-21 | Stop reason: HOSPADM

## 2023-08-21 RX ORDER — ONDANSETRON 4 MG/1
4 TABLET, ORALLY DISINTEGRATING ORAL 3 TIMES DAILY PRN
Qty: 21 TABLET | Refills: 0 | Status: SHIPPED | OUTPATIENT
Start: 2023-08-21

## 2023-08-21 RX ORDER — DIPHENHYDRAMINE HYDROCHLORIDE 50 MG/ML
25 INJECTION INTRAMUSCULAR; INTRAVENOUS ONCE
Status: COMPLETED | OUTPATIENT
Start: 2023-08-21 | End: 2023-08-21

## 2023-08-21 RX ORDER — KETOROLAC TROMETHAMINE 15 MG/ML
15 INJECTION, SOLUTION INTRAMUSCULAR; INTRAVENOUS ONCE
Status: COMPLETED | OUTPATIENT
Start: 2023-08-21 | End: 2023-08-21

## 2023-08-21 RX ADMIN — SODIUM CHLORIDE 80 ML: 9 INJECTION, SOLUTION INTRAVENOUS at 04:39

## 2023-08-21 RX ADMIN — SODIUM CHLORIDE 1000 ML: 9 INJECTION, SOLUTION INTRAVENOUS at 01:39

## 2023-08-21 RX ADMIN — ONDANSETRON 4 MG: 2 INJECTION INTRAMUSCULAR; INTRAVENOUS at 01:52

## 2023-08-21 RX ADMIN — METOCLOPRAMIDE HYDROCHLORIDE 10 MG: 5 INJECTION INTRAMUSCULAR; INTRAVENOUS at 04:23

## 2023-08-21 RX ADMIN — KETOROLAC TROMETHAMINE 15 MG: 15 INJECTION, SOLUTION INTRAMUSCULAR; INTRAVENOUS at 04:22

## 2023-08-21 RX ADMIN — IOPAMIDOL 75 ML: 755 INJECTION, SOLUTION INTRAVENOUS at 04:39

## 2023-08-21 RX ADMIN — DIPHENHYDRAMINE HYDROCHLORIDE 25 MG: 50 INJECTION INTRAMUSCULAR; INTRAVENOUS at 04:23

## 2023-08-21 RX ADMIN — SODIUM CHLORIDE, PRESERVATIVE FREE 10 ML: 5 INJECTION INTRAVENOUS at 04:39

## 2023-08-22 NOTE — ED PROVIDER NOTES
AM        START taking these medications    Details   dicyclomine (BENTYL) 10 MG capsule Take 1 capsule by mouth 4 times daily, Disp-120 capsule, R-0Normal      ondansetron (ZOFRAN-ODT) 4 MG disintegrating tablet Place 1 tablet under the tongue 3 times daily as needed for Nausea or Vomiting, Disp-21 tablet, R-0Normal             Savannah Fitzgerald DO  EmergencyMedicine Attending    (Please note that portions of this note were completed with a voice recognition program.  Efforts were made to edit the dictations but occasionally words are mis-transcribed.)     Savannah Fitzgerald DO  08/22/23 [id number]